# Patient Record
Sex: FEMALE | Race: WHITE | NOT HISPANIC OR LATINO
[De-identification: names, ages, dates, MRNs, and addresses within clinical notes are randomized per-mention and may not be internally consistent; named-entity substitution may affect disease eponyms.]

---

## 2017-01-19 ENCOUNTER — APPOINTMENT (OUTPATIENT)
Dept: INTERNAL MEDICINE | Facility: CLINIC | Age: 67
End: 2017-01-19

## 2017-01-19 VITALS
TEMPERATURE: 98 F | HEIGHT: 65 IN | WEIGHT: 130 LBS | SYSTOLIC BLOOD PRESSURE: 118 MMHG | HEART RATE: 80 BPM | DIASTOLIC BLOOD PRESSURE: 65 MMHG | OXYGEN SATURATION: 97 % | BODY MASS INDEX: 21.66 KG/M2

## 2017-02-13 LAB
ALBUMIN SERPL ELPH-MCNC: 4 G/DL
ALP BLD-CCNC: 61 U/L
ALT SERPL-CCNC: 17 U/L
ANION GAP SERPL CALC-SCNC: 15 MMOL/L
AST SERPL-CCNC: 19 U/L
BILIRUB SERPL-MCNC: 0.4 MG/DL
BUN SERPL-MCNC: 10 MG/DL
CALCIUM SERPL-MCNC: 9.8 MG/DL
CHLORIDE SERPL-SCNC: 103 MMOL/L
CHOLEST SERPL-MCNC: 170 MG/DL
CHOLEST/HDLC SERPL: 2.2 RATIO
CO2 SERPL-SCNC: 24 MMOL/L
CREAT SERPL-MCNC: 0.67 MG/DL
GLUCOSE SERPL-MCNC: 88 MG/DL
HBA1C MFR BLD HPLC: 5.7 %
HDLC SERPL-MCNC: 76 MG/DL
LDLC SERPL CALC-MCNC: 83 MG/DL
POTASSIUM SERPL-SCNC: 4.4 MMOL/L
PROT SERPL-MCNC: 6.6 G/DL
SODIUM SERPL-SCNC: 142 MMOL/L
TRIGL SERPL-MCNC: 54 MG/DL

## 2017-05-05 ENCOUNTER — OTHER (OUTPATIENT)
Age: 67
End: 2017-05-05

## 2017-05-05 DIAGNOSIS — S82.209A UNSPECIFIED FRACTURE OF SHAFT OF UNSPECIFIED TIBIA, INITIAL ENCOUNTER FOR CLOSED FRACTURE: ICD-10-CM

## 2017-05-08 ENCOUNTER — OTHER (OUTPATIENT)
Age: 67
End: 2017-05-08

## 2017-05-15 ENCOUNTER — CLINICAL ADVICE (OUTPATIENT)
Age: 67
End: 2017-05-15

## 2017-05-24 ENCOUNTER — MEDICATION RENEWAL (OUTPATIENT)
Age: 67
End: 2017-05-24

## 2017-06-15 ENCOUNTER — APPOINTMENT (OUTPATIENT)
Dept: INTERNAL MEDICINE | Facility: CLINIC | Age: 67
End: 2017-06-15

## 2017-06-15 VITALS
TEMPERATURE: 97.8 F | DIASTOLIC BLOOD PRESSURE: 80 MMHG | OXYGEN SATURATION: 98 % | SYSTOLIC BLOOD PRESSURE: 140 MMHG | HEART RATE: 77 BPM

## 2017-06-15 DIAGNOSIS — K63.5 POLYP OF COLON: ICD-10-CM

## 2017-06-15 DIAGNOSIS — R73.09 OTHER ABNORMAL GLUCOSE: ICD-10-CM

## 2017-07-03 LAB — HBA1C MFR BLD HPLC: 5.2 %

## 2017-09-02 ENCOUNTER — CLINICAL ADVICE (OUTPATIENT)
Age: 67
End: 2017-09-02

## 2017-10-19 ENCOUNTER — APPOINTMENT (OUTPATIENT)
Dept: INTERNAL MEDICINE | Facility: CLINIC | Age: 67
End: 2017-10-19

## 2018-01-17 ENCOUNTER — APPOINTMENT (OUTPATIENT)
Dept: GASTROENTEROLOGY | Facility: HOSPITAL | Age: 68
End: 2018-01-17

## 2018-01-17 ENCOUNTER — RESULT REVIEW (OUTPATIENT)
Age: 68
End: 2018-01-17

## 2018-01-17 ENCOUNTER — OUTPATIENT (OUTPATIENT)
Dept: OUTPATIENT SERVICES | Facility: HOSPITAL | Age: 68
LOS: 1 days | Discharge: HOME | End: 2018-01-17
Payer: MEDICARE

## 2018-01-17 LAB
HCT VFR BLD CALC: 39.7 % — SIGNIFICANT CHANGE UP (ref 34.5–45)
HGB BLD-MCNC: 13.8 G/DL — SIGNIFICANT CHANGE UP (ref 11.5–15.5)
MCHC RBC-ENTMCNC: 32.5 PG — SIGNIFICANT CHANGE UP (ref 27–34)
MCHC RBC-ENTMCNC: 34.8 G/DL — SIGNIFICANT CHANGE UP (ref 32–36)
MCV RBC AUTO: 93.4 FL — SIGNIFICANT CHANGE UP (ref 80–100)
PLATELET # BLD AUTO: 208 K/UL — SIGNIFICANT CHANGE UP (ref 150–400)
RBC # BLD: 4.25 M/UL — SIGNIFICANT CHANGE UP (ref 3.8–5.2)
RBC # FLD: 12.4 % — SIGNIFICANT CHANGE UP (ref 10.3–16.9)
WBC # BLD: 3.1 K/UL — LOW (ref 3.8–10.5)
WBC # FLD AUTO: 3.1 K/UL — LOW (ref 3.8–10.5)

## 2018-01-17 PROCEDURE — 93005 ELECTROCARDIOGRAM TRACING: CPT

## 2018-01-17 PROCEDURE — 45380 COLONOSCOPY AND BIOPSY: CPT | Mod: XS

## 2018-01-17 PROCEDURE — 45385 COLONOSCOPY W/LESION REMOVAL: CPT

## 2018-01-17 PROCEDURE — 93010 ELECTROCARDIOGRAM REPORT: CPT

## 2018-01-17 PROCEDURE — 88305 TISSUE EXAM BY PATHOLOGIST: CPT

## 2018-01-17 PROCEDURE — 85027 COMPLETE CBC AUTOMATED: CPT

## 2018-01-17 PROCEDURE — 36415 COLL VENOUS BLD VENIPUNCTURE: CPT

## 2018-01-18 LAB — SURGICAL PATHOLOGY STUDY: SIGNIFICANT CHANGE UP

## 2018-01-24 ENCOUNTER — RESULT REVIEW (OUTPATIENT)
Age: 68
End: 2018-01-24

## 2018-02-12 ENCOUNTER — CLINICAL ADVICE (OUTPATIENT)
Age: 68
End: 2018-02-12

## 2018-02-13 ENCOUNTER — TRANSCRIPTION ENCOUNTER (OUTPATIENT)
Age: 68
End: 2018-02-13

## 2018-03-08 ENCOUNTER — RX RENEWAL (OUTPATIENT)
Age: 68
End: 2018-03-08

## 2018-06-08 ENCOUNTER — RX RENEWAL (OUTPATIENT)
Age: 68
End: 2018-06-08

## 2018-07-23 ENCOUNTER — APPOINTMENT (OUTPATIENT)
Dept: INTERNAL MEDICINE | Facility: CLINIC | Age: 68
End: 2018-07-23
Payer: MEDICARE

## 2018-07-23 VITALS
SYSTOLIC BLOOD PRESSURE: 145 MMHG | WEIGHT: 130 LBS | HEIGHT: 65 IN | TEMPERATURE: 98.4 F | HEART RATE: 72 BPM | DIASTOLIC BLOOD PRESSURE: 67 MMHG | OXYGEN SATURATION: 98 % | RESPIRATION RATE: 15 BRPM | BODY MASS INDEX: 21.66 KG/M2

## 2018-07-23 DIAGNOSIS — M20.40 OTHER HAMMER TOE(S) (ACQUIRED), UNSPECIFIED FOOT: ICD-10-CM

## 2018-07-23 DIAGNOSIS — L98.9 DISORDER OF THE SKIN AND SUBCUTANEOUS TISSUE, UNSPECIFIED: ICD-10-CM

## 2018-07-23 DIAGNOSIS — R06.02 SHORTNESS OF BREATH: ICD-10-CM

## 2018-07-23 PROCEDURE — 99215 OFFICE O/P EST HI 40 MIN: CPT | Mod: 25

## 2018-07-23 PROCEDURE — 93000 ELECTROCARDIOGRAM COMPLETE: CPT

## 2018-07-23 PROCEDURE — 36415 COLL VENOUS BLD VENIPUNCTURE: CPT

## 2018-07-23 RX ORDER — WALKER
EACH MISCELLANEOUS
Qty: 1 | Refills: 0 | Status: DISCONTINUED | COMMUNITY
Start: 2017-05-05 | End: 2018-07-23

## 2018-07-26 LAB
25(OH)D3 SERPL-MCNC: 31.8 NG/ML
ALBUMIN SERPL ELPH-MCNC: 4.6 G/DL
ALP BLD-CCNC: 72 U/L
ALT SERPL-CCNC: 16 U/L
ANA PAT FLD IF-IMP: ABNORMAL
ANA SER IF-ACNC: ABNORMAL
ANION GAP SERPL CALC-SCNC: 16 MMOL/L
AST SERPL-CCNC: 20 U/L
BASOPHILS # BLD AUTO: 0.03 K/UL
BASOPHILS NFR BLD AUTO: 0.6 %
BILIRUB SERPL-MCNC: 0.6 MG/DL
BUN SERPL-MCNC: 10 MG/DL
CALCIUM SERPL-MCNC: 10 MG/DL
CALCIUM SERPL-MCNC: 10 MG/DL
CHLORIDE SERPL-SCNC: 98 MMOL/L
CHOLEST SERPL-MCNC: 197 MG/DL
CHOLEST/HDLC SERPL: 2.5 RATIO
CO2 SERPL-SCNC: 26 MMOL/L
CREAT SERPL-MCNC: 0.73 MG/DL
CREAT SPEC-SCNC: 21 MG/DL
CRP SERPL-MCNC: <0.1 MG/DL
EOSINOPHIL # BLD AUTO: 0.01 K/UL
EOSINOPHIL NFR BLD AUTO: 0.2 %
ERYTHROCYTE [SEDIMENTATION RATE] IN BLOOD BY WESTERGREN METHOD: 14 MM/HR
GLUCOSE SERPL-MCNC: 94 MG/DL
HBA1C MFR BLD HPLC: 5.8 %
HCT VFR BLD CALC: 43.9 %
HDLC SERPL-MCNC: 79 MG/DL
HGB BLD-MCNC: 14.6 G/DL
IMM GRANULOCYTES NFR BLD AUTO: 0.2 %
LDLC SERPL CALC-MCNC: 104 MG/DL
LYMPHOCYTES # BLD AUTO: 1.18 K/UL
LYMPHOCYTES NFR BLD AUTO: 24.9 %
MAN DIFF?: NORMAL
MCHC RBC-ENTMCNC: 32.2 PG
MCHC RBC-ENTMCNC: 33.3 GM/DL
MCV RBC AUTO: 96.9 FL
MICROALBUMIN 24H UR DL<=1MG/L-MCNC: <1.2 MG/DL
MICROALBUMIN/CREAT 24H UR-RTO: NORMAL
MONOCYTES # BLD AUTO: 0.45 K/UL
MONOCYTES NFR BLD AUTO: 9.5 %
NEUTROPHILS # BLD AUTO: 3.05 K/UL
NEUTROPHILS NFR BLD AUTO: 64.6 %
PARATHYROID HORMONE INTACT: 31 PG/ML
PLATELET # BLD AUTO: 215 K/UL
POTASSIUM SERPL-SCNC: 4.4 MMOL/L
PROT SERPL-MCNC: 7.7 G/DL
RBC # BLD: 4.53 M/UL
RBC # FLD: 13.3 %
SODIUM SERPL-SCNC: 141 MMOL/L
TRIGL SERPL-MCNC: 71 MG/DL
WBC # FLD AUTO: 4.73 K/UL

## 2018-09-10 ENCOUNTER — APPOINTMENT (OUTPATIENT)
Dept: INTERNAL MEDICINE | Facility: CLINIC | Age: 68
End: 2018-09-10
Payer: MEDICARE

## 2018-09-10 VITALS
HEIGHT: 65 IN | OXYGEN SATURATION: 98 % | HEART RATE: 79 BPM | TEMPERATURE: 98.1 F | WEIGHT: 127 LBS | BODY MASS INDEX: 21.16 KG/M2 | SYSTOLIC BLOOD PRESSURE: 133 MMHG | RESPIRATION RATE: 15 BRPM | DIASTOLIC BLOOD PRESSURE: 64 MMHG

## 2018-09-10 DIAGNOSIS — R73.03 PREDIABETES.: ICD-10-CM

## 2018-09-10 DIAGNOSIS — Z91.89 OTHER SPECIFIED PERSONAL RISK FACTORS, NOT ELSEWHERE CLASSIFIED: ICD-10-CM

## 2018-09-10 PROCEDURE — 99214 OFFICE O/P EST MOD 30 MIN: CPT

## 2018-09-12 ENCOUNTER — MEDICATION RENEWAL (OUTPATIENT)
Age: 68
End: 2018-09-12

## 2018-09-13 ENCOUNTER — RX RENEWAL (OUTPATIENT)
Age: 68
End: 2018-09-13

## 2018-09-17 ENCOUNTER — FORM ENCOUNTER (OUTPATIENT)
Age: 68
End: 2018-09-17

## 2018-09-18 ENCOUNTER — OUTPATIENT (OUTPATIENT)
Dept: OUTPATIENT SERVICES | Facility: HOSPITAL | Age: 68
LOS: 1 days | End: 2018-09-18
Payer: MEDICARE

## 2018-09-18 ENCOUNTER — APPOINTMENT (OUTPATIENT)
Dept: ULTRASOUND IMAGING | Facility: HOSPITAL | Age: 68
End: 2018-09-18
Payer: MEDICARE

## 2018-09-18 PROCEDURE — 76536 US EXAM OF HEAD AND NECK: CPT

## 2018-09-18 PROCEDURE — 76536 US EXAM OF HEAD AND NECK: CPT | Mod: 26

## 2018-11-14 ENCOUNTER — LABORATORY RESULT (OUTPATIENT)
Age: 68
End: 2018-11-14

## 2018-11-14 ENCOUNTER — APPOINTMENT (OUTPATIENT)
Dept: RHEUMATOLOGY | Facility: CLINIC | Age: 68
End: 2018-11-14
Payer: MEDICARE

## 2018-11-14 VITALS
SYSTOLIC BLOOD PRESSURE: 129 MMHG | WEIGHT: 127 LBS | HEIGHT: 65 IN | TEMPERATURE: 98 F | BODY MASS INDEX: 21.16 KG/M2 | DIASTOLIC BLOOD PRESSURE: 75 MMHG | RESPIRATION RATE: 15 BRPM | OXYGEN SATURATION: 98 % | HEART RATE: 86 BPM

## 2018-11-14 DIAGNOSIS — R76.8 OTHER SPECIFIED ABNORMAL IMMUNOLOGICAL FINDINGS IN SERUM: ICD-10-CM

## 2018-11-14 PROCEDURE — 99214 OFFICE O/P EST MOD 30 MIN: CPT | Mod: 25

## 2018-11-14 PROCEDURE — 36415 COLL VENOUS BLD VENIPUNCTURE: CPT

## 2018-11-15 LAB
ALBUMIN SERPL ELPH-MCNC: 4.4 G/DL
ALP BLD-CCNC: 60 U/L
ALT SERPL-CCNC: 18 U/L
ANION GAP SERPL CALC-SCNC: 12 MMOL/L
APTT BLD: 33.4 SEC
AST SERPL-CCNC: 23 U/L
B2 MICROGLOB SERPL-MCNC: 1.4 MG/L
BASOPHILS # BLD AUTO: 0.02 K/UL
BASOPHILS NFR BLD AUTO: 0.6 %
BILIRUB SERPL-MCNC: 0.4 MG/DL
BUN SERPL-MCNC: 9 MG/DL
C3 SERPL-MCNC: 108 MG/DL
C4 SERPL-MCNC: 24 MG/DL
CALCIUM SERPL-MCNC: 10.1 MG/DL
CCP AB SER IA-ACNC: <8 UNITS
CHLORIDE SERPL-SCNC: 101 MMOL/L
CK SERPL-CCNC: 66 U/L
CO2 SERPL-SCNC: 27 MMOL/L
CONFIRM: 23.2 SEC
CREAT SERPL-MCNC: 0.65 MG/DL
DRVVT IMM 1:2 NP PPP: NORMAL
DRVVT SCREEN TO CONFIRM RATIO: 0.94 RATIO
EOSINOPHIL # BLD AUTO: 0.01 K/UL
EOSINOPHIL NFR BLD AUTO: 0.3 %
ERYTHROCYTE [SEDIMENTATION RATE] IN BLOOD BY WESTERGREN METHOD: 6 MM/HR
GLUCOSE SERPL-MCNC: 87 MG/DL
HCT VFR BLD CALC: 42.6 %
HGB BLD-MCNC: 13.8 G/DL
IMM GRANULOCYTES NFR BLD AUTO: 0.3 %
LYMPHOCYTES # BLD AUTO: 1.11 K/UL
LYMPHOCYTES NFR BLD AUTO: 31.9 %
MAN DIFF?: NORMAL
MCHC RBC-ENTMCNC: 31.9 PG
MCHC RBC-ENTMCNC: 32.4 GM/DL
MCV RBC AUTO: 98.6 FL
MONOCYTES # BLD AUTO: 0.31 K/UL
MONOCYTES NFR BLD AUTO: 8.9 %
MPO AB + PR3 PNL SER: NORMAL
NEUTROPHILS # BLD AUTO: 2.02 K/UL
NEUTROPHILS NFR BLD AUTO: 58 %
PLATELET # BLD AUTO: 224 K/UL
POTASSIUM SERPL-SCNC: 4.5 MMOL/L
PROT SERPL-MCNC: 7.4 G/DL
RBC # BLD: 4.32 M/UL
RBC # FLD: 13.1 %
RF+CCP IGG SER-IMP: NEGATIVE
RHEUMATOID FACT SER QL: <10 IU/ML
SCREEN DRVVT: 26.8 SEC
SILICA CLOTTING TIME INTERPRETATION: NORMAL
SILICA CLOTTING TIME S/C: 0.97 RATIO
SODIUM SERPL-SCNC: 140 MMOL/L
URATE SERPL-MCNC: 3.1 MG/DL
WBC # FLD AUTO: 3.48 K/UL

## 2018-11-20 LAB
25(OH)D3 SERPL-MCNC: 44.7 NG/ML
ANA SER IF-ACNC: NEGATIVE
B2 GLYCOPROT1 IGA SERPL IA-ACNC: <5 SAU
B2 GLYCOPROT1 IGG SER-ACNC: <5 SGU
B2 GLYCOPROT1 IGM SER-ACNC: <5 SMU
CARDIOLIPIN AB SER IA-ACNC: NEGATIVE
CENTROMERE IGG SER-ACNC: <0.2 AL
CH50 SERPL-MCNC: 67 U/ML
CRYOFIB BLD-MCNC: NEGATIVE
DSDNA AB SER-ACNC: <12 IU/ML
ENA RNP AB SER IA-ACNC: 0.3 AL
ENA SCL70 IGG SER IA-ACNC: <0.2 AL
ENA SM AB SER IA-ACNC: <0.2 AL
ENA SS-A AB SER IA-ACNC: 1.1 AL
ENA SS-B AB SER IA-ACNC: <0.2 AL
HISTONE AB SER QL: 0.5 UNITS
T PALLIDUM AB SER QL IA: NEGATIVE

## 2018-11-26 ENCOUNTER — APPOINTMENT (OUTPATIENT)
Dept: GASTROENTEROLOGY | Facility: CLINIC | Age: 68
End: 2018-11-26
Payer: MEDICARE

## 2018-11-26 VITALS
BODY MASS INDEX: 21.33 KG/M2 | HEIGHT: 65 IN | RESPIRATION RATE: 14 BRPM | WEIGHT: 128 LBS | TEMPERATURE: 97.9 F | OXYGEN SATURATION: 97 % | DIASTOLIC BLOOD PRESSURE: 70 MMHG | HEART RATE: 71 BPM | SYSTOLIC BLOOD PRESSURE: 140 MMHG

## 2018-11-26 PROCEDURE — 99204 OFFICE O/P NEW MOD 45 MIN: CPT

## 2018-11-28 ENCOUNTER — MOBILE ON CALL (OUTPATIENT)
Age: 68
End: 2018-11-28

## 2018-11-28 DIAGNOSIS — R12 HEARTBURN: ICD-10-CM

## 2018-12-11 ENCOUNTER — FORM ENCOUNTER (OUTPATIENT)
Age: 68
End: 2018-12-11

## 2018-12-12 ENCOUNTER — APPOINTMENT (OUTPATIENT)
Dept: RADIOLOGY | Facility: HOSPITAL | Age: 68
End: 2018-12-12
Payer: MEDICARE

## 2018-12-12 ENCOUNTER — OUTPATIENT (OUTPATIENT)
Dept: OUTPATIENT SERVICES | Facility: HOSPITAL | Age: 68
LOS: 1 days | End: 2018-12-12
Payer: MEDICARE

## 2018-12-12 PROCEDURE — 77081 DXA BONE DENSITY APPENDICULR: CPT | Mod: 26

## 2018-12-12 PROCEDURE — 74220 X-RAY XM ESOPHAGUS 1CNTRST: CPT

## 2018-12-12 PROCEDURE — 77081 DXA BONE DENSITY APPENDICULR: CPT

## 2018-12-12 PROCEDURE — 74220 X-RAY XM ESOPHAGUS 1CNTRST: CPT | Mod: 26

## 2018-12-17 ENCOUNTER — MOBILE ON CALL (OUTPATIENT)
Age: 68
End: 2018-12-17

## 2018-12-18 ENCOUNTER — APPOINTMENT (OUTPATIENT)
Dept: INTERNAL MEDICINE | Facility: CLINIC | Age: 68
End: 2018-12-18
Payer: MEDICARE

## 2018-12-18 ENCOUNTER — OTHER (OUTPATIENT)
Age: 68
End: 2018-12-18

## 2018-12-18 VITALS
TEMPERATURE: 97.8 F | BODY MASS INDEX: 21.66 KG/M2 | WEIGHT: 130 LBS | SYSTOLIC BLOOD PRESSURE: 136 MMHG | DIASTOLIC BLOOD PRESSURE: 70 MMHG | HEIGHT: 65 IN | OXYGEN SATURATION: 97 % | HEART RATE: 75 BPM

## 2018-12-18 DIAGNOSIS — R76.8 OTHER SPECIFIED ABNORMAL IMMUNOLOGICAL FINDINGS IN SERUM: ICD-10-CM

## 2018-12-18 PROCEDURE — 90732 PPSV23 VACC 2 YRS+ SUBQ/IM: CPT

## 2018-12-18 PROCEDURE — G0009: CPT

## 2018-12-18 PROCEDURE — 99214 OFFICE O/P EST MOD 30 MIN: CPT | Mod: 25

## 2018-12-20 LAB — UREA BREATH TEST QL: NEGATIVE

## 2018-12-21 ENCOUNTER — MOBILE ON CALL (OUTPATIENT)
Age: 68
End: 2018-12-21

## 2018-12-28 ENCOUNTER — TRANSCRIPTION ENCOUNTER (OUTPATIENT)
Age: 68
End: 2018-12-28

## 2019-01-01 ENCOUNTER — TRANSCRIPTION ENCOUNTER (OUTPATIENT)
Age: 69
End: 2019-01-01

## 2019-02-27 ENCOUNTER — APPOINTMENT (OUTPATIENT)
Dept: OTOLARYNGOLOGY | Facility: CLINIC | Age: 69
End: 2019-02-27
Payer: MEDICARE

## 2019-02-27 VITALS
BODY MASS INDEX: 21.33 KG/M2 | HEIGHT: 65 IN | DIASTOLIC BLOOD PRESSURE: 63 MMHG | WEIGHT: 128 LBS | SYSTOLIC BLOOD PRESSURE: 139 MMHG | HEART RATE: 80 BPM

## 2019-02-27 DIAGNOSIS — J39.2 OTHER DISEASES OF PHARYNX: ICD-10-CM

## 2019-02-27 DIAGNOSIS — R19.6 HALITOSIS: ICD-10-CM

## 2019-02-27 DIAGNOSIS — J30.9 ALLERGIC RHINITIS, UNSPECIFIED: ICD-10-CM

## 2019-02-27 DIAGNOSIS — R59.1 GENERALIZED ENLARGED LYMPH NODES: ICD-10-CM

## 2019-02-27 DIAGNOSIS — L08.9 LOCAL INFECTION OF THE SKIN AND SUBCUTANEOUS TISSUE, UNSPECIFIED: ICD-10-CM

## 2019-02-27 DIAGNOSIS — C50.911 MALIGNANT NEOPLASM OF UNSPECIFIED SITE OF RIGHT FEMALE BREAST: ICD-10-CM

## 2019-02-27 DIAGNOSIS — Z82.49 FAMILY HISTORY OF ISCHEMIC HEART DISEASE AND OTHER DISEASES OF THE CIRCULATORY SYSTEM: ICD-10-CM

## 2019-02-27 DIAGNOSIS — Z92.29 PERSONAL HISTORY OF OTHER DRUG THERAPY: ICD-10-CM

## 2019-02-27 PROCEDURE — 31231 NASAL ENDOSCOPY DX: CPT

## 2019-02-27 PROCEDURE — 99204 OFFICE O/P NEW MOD 45 MIN: CPT | Mod: 25

## 2019-02-28 ENCOUNTER — OTHER (OUTPATIENT)
Age: 69
End: 2019-02-28

## 2019-03-03 ENCOUNTER — APPOINTMENT (OUTPATIENT)
Dept: SLEEP CENTER | Facility: HOSPITAL | Age: 69
End: 2019-03-03

## 2019-03-03 PROBLEM — J30.9 ALLERGIC RHINITIS: Status: ACTIVE | Noted: 2017-06-15

## 2019-03-03 PROBLEM — Z92.29 HISTORY OF PNEUMOCOCCAL VACCINATION: Status: RESOLVED | Noted: 2018-12-18 | Resolved: 2019-03-03

## 2019-03-03 PROBLEM — Z82.49 FAMILY HISTORY OF CORONARY ARTERY DISEASE: Status: ACTIVE | Noted: 2019-02-27

## 2019-03-03 RX ORDER — FLUTICASONE PROPIONATE 50 UG/1
50 SPRAY, METERED NASAL DAILY
Qty: 1 | Refills: 0 | Status: DISCONTINUED | COMMUNITY
Start: 2018-12-18 | End: 2019-02-27

## 2019-03-03 NOTE — PROCEDURE
[FreeTextEntry6] : \par Indication: Chronic sinusitis \par -Verbal consent was obtained from patient prior to exam. \par - Cristino-Synephrine spray applied to nose bilaterally.\par Nasal endoscopy was performed with flexible scope.\par Findings: \par -- Inferior turbinates mildly edematous bilateral.\par -- Septum was deviated to the left with spur against left inferior turbinate \par -- No polyps either side nose\par -- Middle and superior turbinates normal bilateral\par -- Middle meatus edema, left; clear on right.  Right SER cloudy draainage; left SER clear\par -- Nasopharynx without mass or exudate\par -- Adenoid pad small \par -- Eustachian orifices were clear bilateral\par \par The patient tolerated the procedure well. [de-identified] : \par Indication: dysphagia, LISA\par -Verbal consent was obtained from patient prior to procedure.\par -Cristino-Synephrine spray applied to the nasal cavities.\par Flexible laryngoscopy was performed via right nostril and revealed the following:\par   -- Nasopharynx had no mass or exudate.\par   -- Marked cobblestoning posterior pharyngeal, minimal erythema.\par   -- Base of tongue was symmetric and not enlarged.\par   -- Vallecula was clear\par   -- Epiglottis, both aryepiglottic folds and both false vocal folds were normal\par   -- Arytenoids both with mild edema and erythema \par   -- True vocal folds were fully mobile and without lesions. \par   -- Post cricoid area was clear.\par   -- Interarytenoid edema was moderate\par \par The patient tolerated the procedure well.\par \par

## 2019-03-03 NOTE — PHYSICAL EXAM
[Midline] : trachea located in midline position [FreeTextEntry1] : No hoarseness. [Nasal Endoscopy Performed] : nasal endoscopy was performed, see procedure section for findings [] : septum deviated to the left [Normal] : palatine tonsils are normal [Laryngoscopy Performed] : laryngoscopy was performed, see procedure section for findings [de-identified] : 0-1+ bilateral. [de-identified] : posterior pharyngeal wall with erythema  [de-identified] : Carotid pulses 2+ bilateral.

## 2019-03-03 NOTE — CONSULT LETTER
[Dear  ___] : Dear  [unfilled], [Consult Letter:] : I had the pleasure of evaluating your patient, [unfilled]. [Consult Closing:] : Thank you very much for allowing me to participate in the care of this patient.  If you have any questions, please do not hesitate to contact me. [Sincerely,] : Sincerely, [DrConstantino  ___] : Dr. WISE [FreeTextEntry2] : Keven Morales MD\par [facility] [facility], [address]\par [place] [id number] [FreeTextEntry1] : \par \par Enclosed please find my office notes for February 27, 2019. \par \par  [FreeTextEntry3] : \par Nel Ford MD \par Otolaryngology, Head and Neck Surgery \par Residency site , [facility]\par

## 2019-03-03 NOTE — HISTORY OF PRESENT ILLNESS
[de-identified] : I was pleased to evaluate this 68 year old woman who was referred by Dr. Morales for choking issues. \par She was accompanied by her sister who also provided history.\par \par Ms. Hogan presents with swallowing issues over the last 3-5 years that are becoming more frequent. \par Occasional choking during swallowing foods or saliva; feels like saliva pools. \par No problems swallowing other liquids. \par She said "food seems to go the wrong way", and she has to cough up the food, which seems to cause her to sneeze. \par Apples are very hard for her to swallow. \par States her throat is always very sticky and that she is aware of it. No FB sensation though. \par No throat pain or voice changes. \par Denies acid reflux or heartburn. \par \par For years she has had postnasal drip and nasal congestion, which predate choking episodes. \par Denies facial pressure. No hx of sinus infections. \par Has mild seasonal allergies (spring/summer).\par Used Flonase x 2 weeks, but does not feel it works. \par She snores very loud and breathes through her mouth. \par Has witnessed apneas. No daytime fatigue. \par \par Complains of having bad breath since 1990s. Uses TheraBreath. Has to scrape white tongue. \par No dental issues, brushes and flosses often. No mouth dryness. \par Has used many OTC mouthwashes and gargles. \par No chronic tonsillitis. Strep throat in her 30s, no hx of childhood infections. \par \par S/p radiation and chemo for breast cancer recurrence in 1991\par \par \par The medical records were reviewed and include the following:\par ESOPHAGRAM (12/12/2018) at [facility]:\par -   imaging demonstrates no consolidation or pleural effusion.  Cardiomediastinal silhouette is normal. Status post right mastectomy.  Mild dextroscoliosis of the lumbar spine.  \par - Radiographic and fluoroscopic examination of the esophagus was performed  using barium sulfate suspension and barium tablet. Swallowing and passage  of contrast through the pharynx appeared normal at fluoroscopy. There is  no evidence of esophageal dysmotility. No mass, stricture or other  esophageal abnormality is seen. There is no hiatal hernia. No  gastroesophageal reflux is observed.\par    IMPRESSION: Normal esophagram.  \par (Images were reviewed.) \par \par US  NECK (09/18/2018) at [facility]:\par  - RIGHT side:\par   --  Level 2  1.2 x 0.5 x 1.0 cm and  0.6 x 0.4 x 0.5 cm\par   --  Level 3  1.0 x 0.3 x 0.7 cm  \par - LEFT side:\par   --  Level 2  1.3 x 0.6 x 0.8 cm \par   -- Level 3  0.5 x 0.2 x 0.4 cm  \par - No abnormal lymph nodes are identified in the neck.  \par - The visualized thyroid gland is unremarkable\par     IMPRESSION: No neck mass or adenopathy identified.       \par \par \par PAST MEDICAL HISTORY\par Hypertension\par Hypercholesterolemia\par Osteoporosis/ arthritis \par Hx of breast cancer (right mastectomy 1988; chest wall resectio 1990, RT/chemo 1991)\par \par PAST SURGICAL HISTORY\par Right radical mastectomy 1988\par Chest wall resection 1990\par Left index finger microsurgeries \par Breast biopsy\par \par ALLERGIES\par Penicillin (rash; no reaction to Keflex)\par \par MEDICATIONS\par Atorvastatin 10 mg\par Amlodipine 5 mg\par Terbinafine 250 mg\par Vitamin D3 1,000IU\par Miralax PRN\par Move free\par Align probiotic\par \par SOCIAL HISTORY\par Never a smoker\par No alcohol use\par No drug use\par Volunteer work with students \par \par FAMILY HISTORY\par Diabetes (Sister)\par Cancer- stomach (M)\par CAD/MI (F)\par

## 2019-03-03 NOTE — ASSESSMENT
[FreeTextEntry1] : Ms. HEARD had the following issues addressed today:\par \par 1.) Dysphagia - choking with saliva and foods for several years.  No obstruction seen on esophagram\par 2.) Chronic sinusitis causing postnasal drip and laryngopharyngeal edema/inflammation \par 3.) Halitosis may also be from the sinusitis\par 4.) Snoring and witnessed apneas suggestive for Obstructive sleep apnea\par \par Plan:\par -- Referral for sleep study at [facility] \par -- Keflex 250 mg bid x 3 weeks \par -- Try mometasone nasal spray\par -- CT scan sinus w/o contrast after antibiotics\par -- Modified Barium swallow if choking does not resolve when postnasal drip stops \par \par Return in 6 weeks (after sleep study/ CT scan)\par

## 2019-03-03 NOTE — REVIEW OF SYSTEMS
[Patient Intake Form Reviewed] : Patient intake form was reviewed [As Noted in HPI] : as noted in HPI [Cough] : cough [Constipation] : constipation [Joint Pain] : joint pain [Joint Swelling] : joint swelling [Negative] : Heme/Lymph [Seasonal Allergies] : seasonal allergies [de-identified] : ?lyme disease [FreeTextEntry9] : back pain, muscle weakness, bone fractures

## 2019-03-07 ENCOUNTER — TRANSCRIPTION ENCOUNTER (OUTPATIENT)
Age: 69
End: 2019-03-07

## 2019-03-11 ENCOUNTER — TRANSCRIPTION ENCOUNTER (OUTPATIENT)
Age: 69
End: 2019-03-11

## 2019-03-24 ENCOUNTER — TRANSCRIPTION ENCOUNTER (OUTPATIENT)
Age: 69
End: 2019-03-24

## 2019-04-02 ENCOUNTER — FORM ENCOUNTER (OUTPATIENT)
Age: 69
End: 2019-04-02

## 2019-04-03 ENCOUNTER — RX RENEWAL (OUTPATIENT)
Age: 69
End: 2019-04-03

## 2019-04-03 ENCOUNTER — APPOINTMENT (OUTPATIENT)
Dept: CT IMAGING | Facility: HOSPITAL | Age: 69
End: 2019-04-03
Payer: MEDICARE

## 2019-04-03 ENCOUNTER — OUTPATIENT (OUTPATIENT)
Dept: OUTPATIENT SERVICES | Facility: HOSPITAL | Age: 69
LOS: 1 days | End: 2019-04-03
Payer: MEDICARE

## 2019-04-03 PROCEDURE — 70486 CT MAXILLOFACIAL W/O DYE: CPT

## 2019-04-03 PROCEDURE — 70486 CT MAXILLOFACIAL W/O DYE: CPT | Mod: 26

## 2019-04-06 ENCOUNTER — TRANSCRIPTION ENCOUNTER (OUTPATIENT)
Age: 69
End: 2019-04-06

## 2019-04-08 ENCOUNTER — TRANSCRIPTION ENCOUNTER (OUTPATIENT)
Age: 69
End: 2019-04-08

## 2019-04-17 ENCOUNTER — APPOINTMENT (OUTPATIENT)
Dept: OTOLARYNGOLOGY | Facility: CLINIC | Age: 69
End: 2019-04-17
Payer: MEDICARE

## 2019-04-17 VITALS
WEIGHT: 128 LBS | DIASTOLIC BLOOD PRESSURE: 65 MMHG | SYSTOLIC BLOOD PRESSURE: 138 MMHG | HEART RATE: 80 BPM | BODY MASS INDEX: 21.33 KG/M2 | HEIGHT: 65 IN

## 2019-04-17 DIAGNOSIS — S42.009A FRACTURE OF UNSPECIFIED PART OF UNSPECIFIED CLAVICLE, INITIAL ENCOUNTER FOR CLOSED FRACTURE: ICD-10-CM

## 2019-04-17 DIAGNOSIS — R09.82 POSTNASAL DRIP: ICD-10-CM

## 2019-04-17 DIAGNOSIS — J32.8 OTHER CHRONIC SINUSITIS: ICD-10-CM

## 2019-04-17 DIAGNOSIS — J34.2 DEVIATED NASAL SEPTUM: ICD-10-CM

## 2019-04-17 PROCEDURE — 99213 OFFICE O/P EST LOW 20 MIN: CPT

## 2019-04-17 RX ORDER — TERBINAFINE HYDROCHLORIDE 250 MG/1
250 TABLET ORAL DAILY
Refills: 0 | Status: COMPLETED | COMMUNITY
End: 2019-04-17

## 2019-04-17 RX ORDER — CEPHALEXIN 250 MG/1
250 TABLET ORAL TWICE DAILY
Qty: 42 | Refills: 0 | Status: COMPLETED | COMMUNITY
Start: 2019-02-27 | End: 2019-04-17

## 2019-04-17 RX ORDER — MULTIVIT-MIN/FOLIC/VIT K/LYCOP 400-300MCG
25 MCG TABLET ORAL DAILY
Qty: 90 | Refills: 1 | Status: COMPLETED | COMMUNITY
Start: 2018-09-10 | End: 2019-04-17

## 2019-04-17 RX ORDER — YEAST,DRIED (S. CEREVISIAE)
POWDER (GRAM) ORAL
Refills: 0 | Status: COMPLETED | COMMUNITY
End: 2019-04-17

## 2019-04-17 RX ORDER — LORATADINE 5 MG
TABLET,CHEWABLE ORAL
Refills: 0 | Status: COMPLETED | COMMUNITY
End: 2019-04-17

## 2019-04-17 RX ORDER — MOMETASONE 50 UG/1
50 SPRAY, METERED NASAL TWICE DAILY
Qty: 3 | Refills: 3 | Status: COMPLETED | COMMUNITY
Start: 2019-02-27 | End: 2019-04-17

## 2019-05-16 PROBLEM — J34.2 DEVIATED NASAL SEPTUM: Status: ACTIVE | Noted: 2019-05-16

## 2019-05-16 PROBLEM — J32.8 OTHER CHRONIC SINUSITIS: Status: ACTIVE | Noted: 2019-02-27

## 2019-05-16 PROBLEM — S42.009A CLAVICLE FRACTURE: Status: ACTIVE | Noted: 2019-05-16

## 2019-05-19 RX ORDER — MULTIVIT-MIN/FOLIC/VIT K/LYCOP 400-300MCG
25 MCG TABLET ORAL DAILY
Refills: 0 | Status: ACTIVE | COMMUNITY

## 2019-05-19 RX ORDER — BISMUTH SUBSALICYLATE 525 MG/1
TABLET ORAL
Qty: 30 | Refills: 0 | Status: DISCONTINUED | COMMUNITY
End: 2019-04-17

## 2019-05-19 NOTE — ASSESSMENT
[FreeTextEntry1] : Ms. HEARD had the following issues addressed today:\par \par 1.) Dysphagia - choking with saliva and foods for several years.  No obstruction seen on barium esophagram\par 2.) Chronic sinusitis causing postnasal drip and not improved with antibiotics x 3 weeks \par 3.) Halitosis may also be from the sinusitis\par 4.) Snoring and witnessed apneas suggestive for Obstructive sleep apnea\par \par Plan:\par -- modified barium swallow for more detailed study\par -- Make appt for sleep study at [facility] \par -- Continue Fluticasone nasal spray\par -- consider endoscopic sinus surgery since there is no change in sx after max medical therapy and there is chronic inflammation on CT.\par \par

## 2019-05-19 NOTE — HISTORY OF PRESENT ILLNESS
[FreeTextEntry1] : \par  [de-identified] : Ms. HOGAN  is a 68 year old F being seen today in the office for f/u.\par \par She completed 3 weeks of Keflex 250 mg bid in March 2019 and did not notice much of an improvement with her symptoms. She still is experiencing the same amount of postnasal drip that gets stuck in her throat. \par Has been using Fluticasone nasal spray daily. \par Sleep study at [facility] was on hold since she fell and broke her collarbone on 3/02/19. \par She plans to reschedule sleep study. \par \par Swallowing issues about the same.  Bolus sometimes goes down the wrong way.\par \par \par CT SINUS noncontrast (04/03/2019) at [facility]:\par - Prior Studies: None\par *  Prior Surgery: None is evident.\par *  Sinuses: The paranasal sinuses are well-developed. \par There is moderate opacification within the right mid-posterior ethmoid labyrinth. \par Scant mucosal thickening is seen posteriorly in the sphenoid sinuses. \par The maxillary and frontal sinuses are clear. \par No fluid level orbits of active odontogenic sinusitis. There is prior root canal of the right-sided maxillary first and second molar teeth, and additional periapical lucency of tooth #1 without root canal.\par *  Sinocranial and Sino-orbital Junctions: Intact and approximately symmetric configuration.\par *  Ostiomeatal Units: Patent, though narrowed in the presence of right eladio bullosa and low-lying ethmoid bullae.\par *  Frontal Sinus Outflow Tracts: Patent although crowded in the face of variant frontoethmoidal air cells.\par *  Sphenoethmoidal Recesses: Patent\par *  Nasal Cavity/Nasopharynx: There is sigmoid configuration of the nasal septum, bowing to the right anteriorly, but deviated significantly to the left in the mid to posterior nasal cavity with large spur distorting the inferior turbinate. \par No soft tissue mass is identified in the nasal cavity or bony destructive change. Noncontrast CT appearance of the nasopharynx is unremarkable. There is excessive dental streak artifact limiting evaluation of the oropharynx.\par *  Orbits: No pathologic soft tissue or mass effect.\par *  Brain:  Limited images through the brain show no hydrocephalus or mass effect.\par *  Mastoids: Well aerated.\par *  Other: Degenerative findings of the cervical spine are partially imaged with left asymmetric facet osteoarthritis, fused at C4-5. There is trace anterolisthesis at this level.\par IMPRESSION:\par Mild sites of inflammatory paranasal sinus disease. No outflow obstruction, but anatomic variation contributes to narrowing thereof, as above. There is nasal septal deviation, with notable spurring to the left.\par (Images were reviewed.) \par \par PRIOR Hx Feb 2019:\par 1.) Ms. Hogan presents with swallowing issues over the last 3-5 years that are becoming more frequent. \par Occasional choking during swallowing foods or saliva; feels like saliva pools. \par No problems swallowing other liquids. \par She said "food seems to go the wrong way", and she has to cough up the food, which seems to cause her to sneeze. \par Apples are very hard for her to swallow. \par States her throat is always very sticky and that she is aware of it. No FB sensation though. \par No throat pain or voice changes. \par Denies acid reflux or heartburn. \par For years she has had postnasal drip and nasal congestion, which predate choking episodes. \par Denies facial pressure. No hx of sinus infections. \par Has mild seasonal allergies (spring/summer).\par Used Flonase x 2 weeks, but does not feel it works. \par 2.) She snores very loud and breathes through her mouth. \par Has witnessed apneas. No daytime fatigue. \par 3.) Complains of having bad breath since 1990s. Uses TheraBreath. Has to scrape white tongue. \par No dental issues, brushes and flosses often. No mouth dryness. \par Has used many OTC mouthwashes and gargles. \par No chronic tonsillitis. Strep throat in her 30s, no hx of childhood infections. \par \par \par \par ESOPHAGRAM (12/12/2018) at [facility]:\par -  imaging demonstrates no consolidation or pleural effusion. Cardiomediastinal silhouette is normal. Status post right mastectomy. Mild dextroscoliosis of the lumbar spine.\par - Radiographic and fluoroscopic examination of the esophagus was performed using barium sulfate suspension and barium tablet. Swallowing and passage of contrast through the pharynx appeared normal at fluoroscopy. There is no evidence of esophageal dysmotility. No mass, stricture or other esophageal abnormality is seen. There is no hiatal hernia. No gastroesophageal reflux is observed.\par IMPRESSION: Normal esophagram.\par \par US NECK (09/18/2018) at [facility]:\par - RIGHT side:\par  -- Level 2 1.2 x 0.5 x 1.0 cm and 0.6 x 0.4 x 0.5 cm\par  -- Level 3 1.0 x 0.3 x 0.7 cm\par - LEFT side:\par  -- Level 2 1.3 x 0.6 x 0.8 cm\par  -- Level 3 0.5 x 0.2 x 0.4 cm\par - No abnormal lymph nodes are identified in the neck.\par - The visualized thyroid gland is unremarkable\par IMPRESSION: No neck mass or adenopathy identified.\par

## 2019-05-19 NOTE — PHYSICAL EXAM
[] : septum deviated to the left [Midline] : trachea located in midline position [Normal] : no neck adenopathy [de-identified] : 0-1+ bilateral.

## 2019-05-19 NOTE — CONSULT LETTER
[Dear  ___] : Dear  [unfilled], [Courtesy Letter:] : I had the pleasure of seeing your patient, [unfilled], in my office today. [Consult Closing:] : Thank you very much for allowing me to participate in the care of this patient.  If you have any questions, please do not hesitate to contact me. [Sincerely,] : Sincerely, [DrConstantino  ___] : Dr. WISE [FreeTextEntry2] : Keven Morales MD\par [facility] [facility], [address]\par [place] [id number] [FreeTextEntry1] : \par \par Enclosed please find my office notes for April 17, 2019. \par \par  [FreeTextEntry3] : \par Nel Ford MD \par Otolaryngology, Head and Neck Surgery \par Residency site , [facility]\par

## 2019-06-09 ENCOUNTER — HOSPITAL ENCOUNTER (EMERGENCY)
Facility: HOSPITAL | Age: 69
Discharge: HOME | End: 2019-06-09
Attending: EMERGENCY MEDICINE | Admitting: EMERGENCY MEDICINE
Payer: COMMERCIAL

## 2019-06-09 VITALS
TEMPERATURE: 98 F | OXYGEN SATURATION: 96 % | WEIGHT: 128 LBS | HEART RATE: 85 BPM | DIASTOLIC BLOOD PRESSURE: 76 MMHG | HEIGHT: 65 IN | SYSTOLIC BLOOD PRESSURE: 179 MMHG | RESPIRATION RATE: 18 BRPM | BODY MASS INDEX: 21.33 KG/M2

## 2019-06-09 DIAGNOSIS — M54.9 BACK PAIN: ICD-10-CM

## 2019-06-09 DIAGNOSIS — M62.838 MUSCLE SPASM: Primary | ICD-10-CM

## 2019-06-09 LAB
BILIRUB UR QL STRIP: NEGATIVE
CLARITY UR: CLEAR
COLOR UR: YELLOW
GLUCOSE UR STRIP-MCNC: NEGATIVE MG/DL
HGB UR QL STRIP.AUTO: NEGATIVE
KETONES UR STRIP-MCNC: NEGATIVE MG/DL
LEUKOCYTE ESTERASE UR QL STRIP: NEGATIVE
NITRITE UR QL STRIP: NEGATIVE
PH UR STRIP.AUTO: 6 [PH]
PROT UR STRIP-MCNC: NEGATIVE MG/DL
SP GR UR STRIP.AUTO: 1.02 (ref 1–1.03)
UROBILINOGEN UR QL STRIP.AUTO: 0.2 E.U./DL

## 2019-06-09 PROCEDURE — 96372 THER/PROPH/DIAG INJ SC/IM: CPT

## 2019-06-09 PROCEDURE — 99283 EMERGENCY DEPT VISIT LOW MDM: CPT | Performed by: EMERGENCY MEDICINE

## 2019-06-09 PROCEDURE — 99283 EMERGENCY DEPT VISIT LOW MDM: CPT

## 2019-06-09 PROCEDURE — 81003 URINALYSIS AUTO W/O SCOPE: CPT | Performed by: EMERGENCY MEDICINE

## 2019-06-09 RX ORDER — NAPROXEN 500 MG/1
500 TABLET ORAL 2 TIMES DAILY PRN
Qty: 14 TABLET | Refills: 0 | Status: SHIPPED | OUTPATIENT
Start: 2019-06-09 | End: 2019-06-16

## 2019-06-09 RX ORDER — METHOCARBAMOL 500 MG/1
500 TABLET, FILM COATED ORAL ONCE
Status: COMPLETED | OUTPATIENT
Start: 2019-06-09 | End: 2019-06-09

## 2019-06-09 RX ORDER — DIAZEPAM 5 MG/ML
5 INJECTION, SOLUTION INTRAMUSCULAR; INTRAVENOUS ONCE
Status: COMPLETED | OUTPATIENT
Start: 2019-06-09 | End: 2019-06-09

## 2019-06-09 RX ORDER — METHOCARBAMOL 500 MG/1
500 TABLET, FILM COATED ORAL 2 TIMES DAILY
Qty: 20 TABLET | Refills: 0 | Status: SHIPPED | OUTPATIENT
Start: 2019-06-09

## 2019-06-09 RX ORDER — LIDOCAINE 50 MG/G
1 PATCH TOPICAL ONCE
Status: DISCONTINUED | OUTPATIENT
Start: 2019-06-09 | End: 2019-06-09 | Stop reason: HOSPADM

## 2019-06-09 RX ORDER — KETOROLAC TROMETHAMINE 30 MG/ML
15 INJECTION, SOLUTION INTRAMUSCULAR; INTRAVENOUS ONCE
Status: COMPLETED | OUTPATIENT
Start: 2019-06-09 | End: 2019-06-09

## 2019-06-09 RX ORDER — DIAZEPAM 5 MG/1
5 TABLET ORAL EVERY 6 HOURS PRN
Qty: 12 TABLET | Refills: 0 | Status: SHIPPED | OUTPATIENT
Start: 2019-06-09 | End: 2019-06-15

## 2019-06-09 RX ADMIN — KETOROLAC TROMETHAMINE 15 MG: 30 INJECTION, SOLUTION INTRAMUSCULAR at 18:08

## 2019-06-09 RX ADMIN — DIAZEPAM 5 MG: 10 INJECTION, SOLUTION INTRAMUSCULAR; INTRAVENOUS at 18:55

## 2019-06-09 RX ADMIN — LIDOCAINE 1 PATCH: 50 PATCH CUTANEOUS at 18:08

## 2019-06-09 RX ADMIN — METHOCARBAMOL 500 MG: 500 TABLET ORAL at 18:08

## 2019-06-10 RX ORDER — LIDOCAINE 50 MG/G
1 PATCH TOPICAL DAILY
Qty: 15 PATCH | Refills: 0 | Status: SHIPPED | OUTPATIENT
Start: 2019-06-10 | End: 2019-06-25

## 2019-06-13 ENCOUNTER — TRANSCRIPTION ENCOUNTER (OUTPATIENT)
Age: 69
End: 2019-06-13

## 2019-06-15 ENCOUNTER — HOSPITAL ENCOUNTER (EMERGENCY)
Facility: HOSPITAL | Age: 69
Discharge: HOME | End: 2019-06-15
Attending: EMERGENCY MEDICINE
Payer: COMMERCIAL

## 2019-06-15 ENCOUNTER — APPOINTMENT (EMERGENCY)
Dept: CT IMAGING | Facility: HOSPITAL | Age: 69
End: 2019-06-15
Payer: COMMERCIAL

## 2019-06-15 VITALS
OXYGEN SATURATION: 95 % | RESPIRATION RATE: 16 BRPM | TEMPERATURE: 97.9 F | SYSTOLIC BLOOD PRESSURE: 134 MMHG | HEART RATE: 67 BPM | HEIGHT: 65 IN | BODY MASS INDEX: 21.34 KG/M2 | WEIGHT: 128.09 LBS | DIASTOLIC BLOOD PRESSURE: 64 MMHG

## 2019-06-15 DIAGNOSIS — S22.080A T12 COMPRESSION FRACTURE (HCC): ICD-10-CM

## 2019-06-15 DIAGNOSIS — M54.50 ACUTE LEFT-SIDED LOW BACK PAIN WITHOUT SCIATICA: Primary | ICD-10-CM

## 2019-06-15 DIAGNOSIS — N28.1 RENAL CYST: ICD-10-CM

## 2019-06-15 LAB
ALBUMIN SERPL BCP-MCNC: 3.5 G/DL (ref 3.5–5)
ALP SERPL-CCNC: 74 U/L (ref 46–116)
ALT SERPL W P-5'-P-CCNC: 17 U/L (ref 12–78)
ANION GAP SERPL CALCULATED.3IONS-SCNC: 6 MMOL/L (ref 4–13)
AST SERPL W P-5'-P-CCNC: 22 U/L (ref 5–45)
BACTERIA UR QL AUTO: ABNORMAL /HPF
BASOPHILS # BLD AUTO: 0.04 THOUSANDS/ΜL (ref 0–0.1)
BASOPHILS NFR BLD AUTO: 1 % (ref 0–1)
BILIRUB SERPL-MCNC: 0.5 MG/DL (ref 0.2–1)
BILIRUB UR QL STRIP: NEGATIVE
BUN SERPL-MCNC: 13 MG/DL (ref 5–25)
CALCIUM SERPL-MCNC: 9.2 MG/DL (ref 8.3–10.1)
CHLORIDE SERPL-SCNC: 102 MMOL/L (ref 100–108)
CLARITY UR: CLEAR
CO2 SERPL-SCNC: 29 MMOL/L (ref 21–32)
COLOR UR: YELLOW
CREAT SERPL-MCNC: 0.62 MG/DL (ref 0.6–1.3)
EOSINOPHIL # BLD AUTO: 0.02 THOUSAND/ΜL (ref 0–0.61)
EOSINOPHIL NFR BLD AUTO: 0 % (ref 0–6)
ERYTHROCYTE [DISTWIDTH] IN BLOOD BY AUTOMATED COUNT: 11.9 % (ref 11.6–15.1)
GFR SERPL CREATININE-BSD FRML MDRD: 91 ML/MIN/1.73SQ M
GLUCOSE SERPL-MCNC: 117 MG/DL (ref 65–140)
GLUCOSE UR STRIP-MCNC: NEGATIVE MG/DL
HCT VFR BLD AUTO: 39.1 % (ref 34.8–46.1)
HGB BLD-MCNC: 13.6 G/DL (ref 11.5–15.4)
HGB UR QL STRIP.AUTO: NEGATIVE
IMM GRANULOCYTES # BLD AUTO: 0.04 THOUSAND/UL (ref 0–0.2)
IMM GRANULOCYTES NFR BLD AUTO: 1 % (ref 0–2)
KETONES UR STRIP-MCNC: ABNORMAL MG/DL
LEUKOCYTE ESTERASE UR QL STRIP: NEGATIVE
LYMPHOCYTES # BLD AUTO: 0.76 THOUSANDS/ΜL (ref 0.6–4.47)
LYMPHOCYTES NFR BLD AUTO: 11 % (ref 14–44)
MCH RBC QN AUTO: 32.4 PG (ref 26.8–34.3)
MCHC RBC AUTO-ENTMCNC: 34.8 G/DL (ref 31.4–37.4)
MCV RBC AUTO: 93 FL (ref 82–98)
MONOCYTES # BLD AUTO: 0.39 THOUSAND/ΜL (ref 0.17–1.22)
MONOCYTES NFR BLD AUTO: 6 % (ref 4–12)
MUCOUS THREADS UR QL AUTO: ABNORMAL
NEUTROPHILS # BLD AUTO: 5.57 THOUSANDS/ΜL (ref 1.85–7.62)
NEUTS SEG NFR BLD AUTO: 81 % (ref 43–75)
NITRITE UR QL STRIP: NEGATIVE
NON-SQ EPI CELLS URNS QL MICRO: ABNORMAL /HPF
NRBC BLD AUTO-RTO: 0 /100 WBCS
PH UR STRIP.AUTO: 6.5 [PH]
PLATELET # BLD AUTO: 186 THOUSANDS/UL (ref 149–390)
PMV BLD AUTO: 10.3 FL (ref 8.9–12.7)
POTASSIUM SERPL-SCNC: 4 MMOL/L (ref 3.5–5.3)
PROT SERPL-MCNC: 7.2 G/DL (ref 6.4–8.2)
PROT UR STRIP-MCNC: NEGATIVE MG/DL
RBC # BLD AUTO: 4.2 MILLION/UL (ref 3.81–5.12)
RBC #/AREA URNS AUTO: ABNORMAL /HPF
SODIUM SERPL-SCNC: 137 MMOL/L (ref 136–145)
SP GR UR STRIP.AUTO: 1.02 (ref 1–1.03)
UROBILINOGEN UR QL STRIP.AUTO: 0.2 E.U./DL
WBC # BLD AUTO: 6.82 THOUSAND/UL (ref 4.31–10.16)
WBC #/AREA URNS AUTO: ABNORMAL /HPF

## 2019-06-15 PROCEDURE — 99284 EMERGENCY DEPT VISIT MOD MDM: CPT

## 2019-06-15 PROCEDURE — 81001 URINALYSIS AUTO W/SCOPE: CPT | Performed by: EMERGENCY MEDICINE

## 2019-06-15 PROCEDURE — 96374 THER/PROPH/DIAG INJ IV PUSH: CPT

## 2019-06-15 PROCEDURE — 74176 CT ABD & PELVIS W/O CONTRAST: CPT

## 2019-06-15 PROCEDURE — 85025 COMPLETE CBC W/AUTO DIFF WBC: CPT | Performed by: EMERGENCY MEDICINE

## 2019-06-15 PROCEDURE — 96375 TX/PRO/DX INJ NEW DRUG ADDON: CPT

## 2019-06-15 PROCEDURE — 80053 COMPREHEN METABOLIC PANEL: CPT | Performed by: EMERGENCY MEDICINE

## 2019-06-15 PROCEDURE — 99284 EMERGENCY DEPT VISIT MOD MDM: CPT | Performed by: EMERGENCY MEDICINE

## 2019-06-15 PROCEDURE — 36415 COLL VENOUS BLD VENIPUNCTURE: CPT | Performed by: EMERGENCY MEDICINE

## 2019-06-15 RX ORDER — ONDANSETRON 2 MG/ML
4 INJECTION INTRAMUSCULAR; INTRAVENOUS ONCE
Status: COMPLETED | OUTPATIENT
Start: 2019-06-15 | End: 2019-06-15

## 2019-06-15 RX ORDER — HYDROMORPHONE HCL/PF 1 MG/ML
0.5 SYRINGE (ML) INJECTION ONCE
Status: COMPLETED | OUTPATIENT
Start: 2019-06-15 | End: 2019-06-15

## 2019-06-15 RX ADMIN — HYDROMORPHONE HYDROCHLORIDE 0.5 MG: 1 INJECTION, SOLUTION INTRAMUSCULAR; INTRAVENOUS; SUBCUTANEOUS at 14:22

## 2019-06-15 RX ADMIN — ONDANSETRON 4 MG: 2 INJECTION INTRAMUSCULAR; INTRAVENOUS at 15:40

## 2019-06-17 ENCOUNTER — OTHER (OUTPATIENT)
Age: 69
End: 2019-06-17

## 2019-06-18 ENCOUNTER — FORM ENCOUNTER (OUTPATIENT)
Age: 69
End: 2019-06-18

## 2019-06-19 ENCOUNTER — APPOINTMENT (OUTPATIENT)
Dept: SLEEP CENTER | Facility: HOSPITAL | Age: 69
End: 2019-06-19

## 2019-06-19 ENCOUNTER — APPOINTMENT (OUTPATIENT)
Dept: RADIOLOGY | Facility: CLINIC | Age: 69
End: 2019-06-19
Payer: MEDICARE

## 2019-06-19 PROCEDURE — 77080 DXA BONE DENSITY AXIAL: CPT | Mod: 26

## 2019-06-20 ENCOUNTER — APPOINTMENT (OUTPATIENT)
Dept: INTERNAL MEDICINE | Facility: CLINIC | Age: 69
End: 2019-06-20
Payer: MEDICARE

## 2019-06-20 VITALS — SYSTOLIC BLOOD PRESSURE: 170 MMHG | DIASTOLIC BLOOD PRESSURE: 80 MMHG

## 2019-06-20 VITALS
HEART RATE: 70 BPM | HEIGHT: 65 IN | SYSTOLIC BLOOD PRESSURE: 167 MMHG | DIASTOLIC BLOOD PRESSURE: 78 MMHG | WEIGHT: 129 LBS | TEMPERATURE: 99.2 F | BODY MASS INDEX: 21.49 KG/M2 | OXYGEN SATURATION: 98 %

## 2019-06-20 DIAGNOSIS — K59.09 OTHER CONSTIPATION: ICD-10-CM

## 2019-06-20 PROCEDURE — 99215 OFFICE O/P EST HI 40 MIN: CPT

## 2019-06-25 ENCOUNTER — APPOINTMENT (OUTPATIENT)
Dept: INTERNAL MEDICINE | Facility: CLINIC | Age: 69
End: 2019-06-25

## 2019-07-10 ENCOUNTER — APPOINTMENT (OUTPATIENT)
Dept: PHYSICAL MEDICINE AND REHAB | Facility: CLINIC | Age: 69
End: 2019-07-10
Payer: MEDICARE

## 2019-07-10 VITALS
HEART RATE: 92 BPM | DIASTOLIC BLOOD PRESSURE: 80 MMHG | HEIGHT: 65 IN | OXYGEN SATURATION: 98 % | WEIGHT: 129 LBS | BODY MASS INDEX: 21.49 KG/M2 | SYSTOLIC BLOOD PRESSURE: 120 MMHG

## 2019-07-10 DIAGNOSIS — M54.6 PAIN IN THORACIC SPINE: ICD-10-CM

## 2019-07-10 PROCEDURE — 99204 OFFICE O/P NEW MOD 45 MIN: CPT

## 2019-07-10 RX ORDER — VITAMIN E (DL,TOCOPHERYL ACET) 180 MG
500-3 CAPSULE ORAL DAILY
Qty: 60 | Refills: 2 | Status: ACTIVE | COMMUNITY
Start: 2019-07-10 | End: 1900-01-01

## 2019-07-10 NOTE — REVIEW OF SYSTEMS
[Patient Intake Form Reviewed] : Patient intake form was reviewed [FreeTextEntry1] : Constitutional: no chills, not feeling tired and no fever. \par Eyes: no discharge, no pain and no redness. \par ENT: no nasal discharge, no nosebleeds and no sore throat. \par Cardiovascular: no chest pain, no palpitations and the heart rate was not fast. \par Respiratory: no shortness of breath, no cough and no wheezing. \par Gastrointestinal: no abdominal pain, no diarrhea, no vomiting and no melena. \par Genitourinary: no pelvic pain, no dysuria, no abnormal urethral discharge and no frequency. \par Integumentary: no skin lesions and no change in a mole. \par Neurological: no dizziness, no fainting and no convulsions. \par Endocrine: no deepening of the voice and no hot flashes. \par Hematologic/Lymphatic: does not bleed easily, no swollen glands and no cervical lymphadenopathy. \par Musculoskeletal: as per HPI, otherwise denies additional joint pain, effusions, stiffness.\par All other review of systems are negative except as noted. \par

## 2019-07-10 NOTE — DATA REVIEWED
[FreeTextEntry1] : XR LS 11/2016: Loss of height of the T12 vertebral body, likely a compression deformity age-indeterminate. There is loss of disc height at L5/S1.

## 2019-07-10 NOTE — HISTORY OF PRESENT ILLNESS
[FreeTextEntry1] : Ms. TAPAN HEARD is a very pleasant 69 year female who comes in for evaluation of back muscles spasms  that has been ongoing for more than 20 years without any specific injury or inciting event. The pain is located primarily on the upper and mid back muscles intermittent in nature without any radiating symptoms to the extremities and described as intense pain. The pain is rated as 2/10 during today's visit, and ranges from 1-10/10. The patient's symptoms are aggravated by standing and hunching for lengthy periods of time and alleviated by sitting down with lumbar support and  ice/hot compresses. The patient denies any night pain, numbness/tingling, weakness, or bowel/bladder dysfunction. The patient has no other complaints at this time.

## 2019-07-10 NOTE — PHYSICAL EXAM
[FreeTextEntry1] : GEN: AAOx3, NAD.\par PSYCH: Normal mood and affect. Responds appropriately to commands.\par EYES: Sclerae Anicteric. No discharge. EOMI.\par RESP: Breathing unlabored.\par CV: Radial pulses 2+ and equal. No varicosities noted.\par EXT: No C/C/E.\par SKIN: No lesions noted.\par LYMPH: No supraclavicular, anterior or posterior cervical lymphadenopathy appreciated.\par GAIT: Non antalgic, Normal reciprocating heel to toe.\par STRENGTH: 5/5 bilateral shoulder abductors, elbow flexors, elbow extensors, wrist extensors, hand intrinsics, long finger flexors to D3 and D5.\par TONE: Normal, No clonus.\par REFLEXES: 2+ symmetric biceps, triceps, brachioradialis, pronator teres. Alejandra's negative bilaterally.\par SENSATION: Grossly intact to light touch bilateral upper extremities.\par INSP: Spine alignment is midline, with no evidence of scoliosis.\par CERVICAL ROM: Flexion, extension, side-bending, rotation, oblique extension all full and pain free.  \par SHOULDER ROM: Full and pain free bilaterally.\par PALP: (+) TTP B-TSparaspinals/Rhomboids/Periscapulars. There is no tenderness over the midline spinous processes, paravertebral muscles, trapezius, levator scapulae or shoulder region bilaterally.\par SPECIAL: Spurling's maneuver negative bilaterally. Axial Compression negative. Lhermitte's sign negative.

## 2019-07-10 NOTE — ASSESSMENT
[FreeTextEntry1] : Impression:\par 1. Thoracalgia\par \par Plan: After review of the history and physical examination the patient's symptoms are consistent with myofascial pain thoracalgia. The diagnosis was discussed in detail with the patient and the impact of posture and stress on her condition. We discussed all the potential treatment options including physical therapy, oral medication, interventional spine procedures, and surgery; as well as alternative therapeutics such as acupuncture and massage. We also discussed the importance of weight loss, ergonomics, and posture in the long term management of the condition. At this time I am recommending that the patient undergo a course of physical therapy. We emphasized the importance of the home exercise program. I have also recommended the patient trial acupuncture as an additional treatment modality. I also recommended the patient trial a daily Curcumin/Turmeric supplement for anti-inflammatory benefit. The patient will return to the office for followup in 2-3 months. The patient expressed verbal understanding and is in agreement with the plan of care. All of the patient's questions and concerns were addressed during today's visit.\par

## 2019-09-05 ENCOUNTER — FORM ENCOUNTER (OUTPATIENT)
Age: 69
End: 2019-09-05

## 2019-09-05 ENCOUNTER — APPOINTMENT (OUTPATIENT)
Dept: ORTHOPEDIC SURGERY | Facility: CLINIC | Age: 69
End: 2019-09-05
Payer: MEDICARE

## 2019-09-05 ENCOUNTER — OUTPATIENT (OUTPATIENT)
Dept: OUTPATIENT SERVICES | Facility: HOSPITAL | Age: 69
LOS: 1 days | End: 2019-09-05
Payer: MEDICARE

## 2019-09-05 VITALS
WEIGHT: 130 LBS | OXYGEN SATURATION: 97 % | HEART RATE: 67 BPM | SYSTOLIC BLOOD PRESSURE: 126 MMHG | HEIGHT: 64 IN | DIASTOLIC BLOOD PRESSURE: 80 MMHG | BODY MASS INDEX: 22.2 KG/M2

## 2019-09-05 PROCEDURE — 73110 X-RAY EXAM OF WRIST: CPT | Mod: 26,RT

## 2019-09-05 PROCEDURE — 73110 X-RAY EXAM OF WRIST: CPT

## 2019-09-05 PROCEDURE — 99205 OFFICE O/P NEW HI 60 MIN: CPT

## 2019-09-05 NOTE — ASSESSMENT
[FreeTextEntry1] : \par 69 year old woman with osteoporosis by DXA at lumbar spine (-2.5) and FN )-3.1) with osteopenia of TH and 1/3 radius. There is history of 2 inches of height loss, slight kyphosis, and several traumatic fractures. The patient has  a remote history of breast cancer and prior treatment with bisphosphonates which she states "did not help"; this likely means that there was no significant increase in BMD although she admits her BMD was stable in the osteopenic range for many years.\par \par Plan:\par 1. the patient may benefit from a re-treatment with bisphosphonate, now that it has been at least 9 years since her last IV Reclast dose; we will check updated bone markers\par 2. check: PTH/ca/alb 25 D, SPEP\par 3. x-ray of wrist (right) ordered today; patient to call tomorrow for results\par 4. f/u after labs to plan Rx, perhaps a dose of IV Reclast based on findings

## 2019-09-05 NOTE — HISTORY OF PRESENT ILLNESS
[FreeTextEntry1] : 69 year old woman with history of breast cancer at age 40 and early chemotherapy induce menopause.\par The patient has been referred because she is concerned about bone loss, and wants to maintain her health, strength, height and avoid kyphosis.\par She has been treated remotely with alendronate (in the 1990s) ibandronate, and at least 3, possibly 4 IV Reclast infusions. She thinks her last IV Reclast Rx would have been at least 9 years ago.\par She has also been previously treated with raloxifene.\par Maximal height was 5'7" she is currently 5'5".\par There is hx of several traumatic fractures, ulna, tibial plateau, clavicle, distal radius, metatarsal.\par Of note, the patient fell about a week ago and injured her right wrist; she has been wearing an Ace bandage and is concerned there may be a hairline fracture.

## 2019-09-05 NOTE — REVIEW OF SYSTEMS
[Constipation] : constipation [Polyuria] : polyuria [Joint Pain] : joint pain [Back Pain] : back pain [Joint Stiffness] : joint stiffness [Depression] : depression [Insomnia] : insomnia [As Noted in HPI] : as noted in HPI [All other systems negative] : All other systems negative

## 2019-09-05 NOTE — PHYSICAL EXAM
[Alert] : alert [No Acute Distress] : no acute distress [Well Nourished] : well nourished [Normal Sclera/Conjunctiva] : normal sclera/conjunctiva [No Proptosis] : no proptosis [No Neck Mass] : no neck mass was observed [Thyroid Not Enlarged] : the thyroid was not enlarged [Anterior Cervical Nodes] : anterior cervical nodes [Post Cervical Nodes] : posterior cervical nodes [Normal] : normal and non tender [Kyphosis] : kyphosis present [Scoliosis] : scoliosis present [No Stigmata of Cushings Syndrome] : no stigmata of cushings syndrome [Normal Gait] : normal gait [No Tremors] : no tremors [de-identified] : swelling of DIPs and PIPs and deformed index finger(left hand) prior injury

## 2019-09-06 ENCOUNTER — APPOINTMENT (OUTPATIENT)
Dept: ORTHOPEDIC SURGERY | Facility: CLINIC | Age: 69
End: 2019-09-06
Payer: MEDICARE

## 2019-09-06 PROCEDURE — 99214 OFFICE O/P EST MOD 30 MIN: CPT | Mod: 25

## 2019-09-06 PROCEDURE — 29075 APPL CST ELBW FNGR SHORT ARM: CPT | Mod: RT

## 2019-09-06 NOTE — PHYSICAL EXAM
[UE] : Sensory: Intact in bilateral upper extremities [Rad] : radial 2+ and symmetric bilaterally [Normal RUE] : Right Upper Extremity: No scars, rashes, lesions, ulcers, skin intact [Normal Touch] : sensation intact for touch [Normal LUE] : Left Upper Extremity: No scars, rashes, lesions, ulcers, skin intact [Normal] : Oriented to person, place, and time, insight and judgement were intact and the affect was normal [de-identified] : No respiratory distress [de-identified] : \par RIGHT wrist\par no significant edema no ecchymoses or erythema.\par There is some deformity and prominence at the thumb CMC joint consistent with osteoarthritis with tenderness.\par There is tenderness on the pisiform in the ulnar palm. Nontender LEFT wrist in this location.\par Wrist range of motion is with about 70 ° dorsiflexion and palmar flexion with mild discomfort in the wrist.\par Intact flexion and extension of the fingers.\par Normal neurovascular exam [de-identified] : \par X-rays of the wrist AP, lateral and oblique views taken yesterday at [facility] show a nondisplaced fracture of the pisiform. Thumb CMC arthrosis

## 2019-09-06 NOTE — HISTORY OF PRESENT ILLNESS
[de-identified] : Ms. Hogan is a 69-year-old Right-hand dominant woman who comes in for evaluation of her wrist. She had seen her endocrinologist, Dr. Berry, who treats her for osteoporosis who obtained x-rays yesterday of the wrist and it showed a pisiform fracture. She was referred for further evaluation. The 1990s she had been ongoing need and Fosamax. She may start on week last now for her osteoporosis.\par She injured her wrist when she was cleaning the tub and she slipped forward and came down on her wrist guard. This was about a week ago and it has been hurting. There wasn't a lot of swelling or bruising.\par She had a hamate fracture in 2017 which healed and was feeling fine and she had a distal radius fracture in the 1990s. Pain is on the ulnar side of her hand but she also has some chronic pain around the thumb where she has arthritis.\par Pain is about 3/10 and it is dull and achy. It is better when the wrist is stabilized. It's worse with activity. She hasn't had any treatment. She applied and Ace wrap.

## 2019-09-06 NOTE — DISCUSSION/SUMMARY
[de-identified] : 69-year-old woman with osteoporosis came down hard on her wrist about a week ago and it looks like she has a nondisplaced pisiform fracture. 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 She should keep it clean and dry. Elevate for any swelling. Tylenol if needed for pain. She is taking vitamin D. If there any problems she should let me know and otherwise followup in 2-1/2-3 weeks at which time we'll change her cast. I may place her into a splint at that time but we will decide when I see her and we will get new x-rays

## 2019-10-01 PROBLEM — S62.164A: Status: ACTIVE | Noted: 2017-05-08

## 2019-10-01 PROBLEM — S60.211A CONTUSION OF RIGHT WRIST, INITIAL ENCOUNTER: Status: ACTIVE | Noted: 2019-09-06

## 2019-10-02 ENCOUNTER — APPOINTMENT (OUTPATIENT)
Dept: ORTHOPEDIC SURGERY | Facility: CLINIC | Age: 69
End: 2019-10-02
Payer: MEDICARE

## 2019-10-02 DIAGNOSIS — S62.164A: ICD-10-CM

## 2019-10-02 DIAGNOSIS — M19.049 PRIMARY OSTEOARTHRITIS, UNSPECIFIED HAND: ICD-10-CM

## 2019-10-02 DIAGNOSIS — S60.211A CONTUSION OF RIGHT WRIST, INITIAL ENCOUNTER: ICD-10-CM

## 2019-10-02 PROCEDURE — 99214 OFFICE O/P EST MOD 30 MIN: CPT

## 2019-10-02 PROCEDURE — 73110 X-RAY EXAM OF WRIST: CPT | Mod: RT

## 2019-10-02 NOTE — HISTORY OF PRESENT ILLNESS
[de-identified] : Ms. Hogan comes in for followup for her RIGHT wrist pisiform fracture that occurred about 4 weeks ago. She has been in a short-arm cast. She has minimal wrist pain. She tolerated the cast fine. She's not taking anything For pain

## 2019-10-02 NOTE — DISCUSSION/SUMMARY
[de-identified] : 69-year-old with RIGHT pisiform fracture 4 weeks ago. Clinically the wrist is improving but there is still tenderness and pain and visible fracture on x-ray. She will wear this splint most of the time as if it were a cast but can remove it occasionally just to wash her arm. Minimize any use her motion.\par She has arthritis of the thumb CMC joint which is somewhat symptomatic as well. If pain persists or gets worse in the future corticosteroid injection can be done. It will probably take another 3-4 weeks for the fracture to heal. She should followup in about 3 weeks.

## 2019-10-02 NOTE — PHYSICAL EXAM
[UE] : Sensory: Intact in bilateral upper extremities [Rad] : radial 2+ and symmetric bilaterally [Normal RUE] : Right Upper Extremity: No scars, rashes, lesions, ulcers, skin intact [Normal LUE] : Left Upper Extremity: No scars, rashes, lesions, ulcers, skin intact [Normal Touch] : sensation intact for touch [de-identified] : RIGHT  wrist exam\par cast was removed\par Skin: Clean, dry, intact. No ecchymosis. No swelling. No palpable joint effusion. No palpable deformity. No crepitus\par ROM: RIGHT wrist is with stiffness with about 60° dorsiflexion and palmar flexion with some ulnar wrist pain. Intact pronation and supination.\par Tender on the pisiform but less than last visit. Tender thumb CMC joint\par Tenderness: No tenderness to palpation at the distal radius, distal ulna, the DRUJ. No pain at the scapholunate interval. No snuffbox pain.\par Strength: Mild weakness  strength\par Stability: Stable DRUJ \par Vasc: 2+ radial pulse, <2s cap refill\par Sensation: Intact to light touch throughout\par Neuro: Negative tinels over median nerve\par AIN/PIN/Ulnar nerve intact to motor/sensation.\par  [de-identified] : \par X-rays RIGHT wrist 3 views with the tunnel view today show Moderate thumb CMC joint arthrosis and nondisplaced fracture of the pisiform.

## 2019-10-17 ENCOUNTER — RX RENEWAL (OUTPATIENT)
Age: 69
End: 2019-10-17

## 2019-10-30 ENCOUNTER — APPOINTMENT (OUTPATIENT)
Dept: ORTHOPEDIC SURGERY | Facility: CLINIC | Age: 69
End: 2019-10-30

## 2019-11-07 ENCOUNTER — APPOINTMENT (OUTPATIENT)
Dept: ORTHOPEDIC SURGERY | Facility: CLINIC | Age: 69
End: 2019-11-07
Payer: MEDICARE

## 2019-11-07 VITALS
SYSTOLIC BLOOD PRESSURE: 128 MMHG | HEIGHT: 65 IN | OXYGEN SATURATION: 98 % | DIASTOLIC BLOOD PRESSURE: 64 MMHG | BODY MASS INDEX: 21.66 KG/M2 | WEIGHT: 130 LBS | HEART RATE: 79 BPM

## 2019-11-07 PROCEDURE — 99214 OFFICE O/P EST MOD 30 MIN: CPT

## 2019-11-07 NOTE — ASSESSMENT
[FreeTextEntry1] : \par 69 year old woman with osteoporosis by DXA at lumbar spine (-2.5) and FN (-3.1) with osteopenia of TH and 1/3 radius. There is history of 2 inches of height loss, slight kyphosis, and several traumatic fractures. The patient has  a remote history of breast cancer and prior treatment with bisphosphonates which she states "did not help"; this likely means that there was no significant increase in BMD although she admits her BMD was stable in the osteopenic range for many years.\par The patient had a recent traumatic nondisplaced fx of her pisiform bone; she is recovering from this. I would resume OP Rx, but in December, with at least 3 months to heal the recent fracture. The patient prefers to resume IV REclast.\par \par Plan:\par 1. pt to do updated chemistries in Dec, then IIV Reclast to be ordered; she will call for results and we will complete the form and referral\par 2. otherwise, labs in Dec 2020 with visit thereafter

## 2019-11-07 NOTE — HISTORY OF PRESENT ILLNESS
[FreeTextEntry1] : 69 year old woman with history of breast cancer at age 40 and early chemotherapy induced menopause.\par The patient has been referred because she is concerned about bone loss, and wants to maintain her health, strength, height and avoid kyphosis.\par She has been treated remotely with alendronate (in the 1990s) ibandronate, and at least 3, possibly 4 IV Reclast infusions. She thinks her last IV Reclast Rx would have been at least 9 years ago.\par She has also been previously treated with raloxifene.\par Maximal height was 5'7" she is currently 5'5".\par There is hx of several traumatic fractures, ulna, tibial plateau, clavicle, distal radius, metatarsal.\par Of note, the patient fell about a week ago and injured her right wrist; she has been wearing an Ace bandage and is concerned there may be a hairline fracture.\par \par at time of initial visit 9/5/19 I ordered films of wrist which showed a nondisplaced fracture of the right pisiform bone.\par the patient was seen on 9/6/19 by Dr. Sandrine Marcial and had a short arm cast placed. Patient wore for a month\par \par today, labs done 9/28/19 were rev:\par glucose; 98\par crea: 0.61\par SPEP: no spike, normal\par BSAP: 11\par CTX: 325\par 25 D: 43.2\par PTH/ca: 33/9.0  alb: 3.9\par \par calcium: 1 in AM\par vitamin D: 1000 IU/day

## 2020-01-08 ENCOUNTER — RX RENEWAL (OUTPATIENT)
Age: 70
End: 2020-01-08

## 2020-01-16 VITALS — TEMPERATURE: 98 F

## 2020-02-03 ENCOUNTER — APPOINTMENT (OUTPATIENT)
Dept: INTERNAL MEDICINE | Facility: CLINIC | Age: 70
End: 2020-02-03
Payer: MEDICARE

## 2020-02-03 VITALS
WEIGHT: 131 LBS | SYSTOLIC BLOOD PRESSURE: 137 MMHG | DIASTOLIC BLOOD PRESSURE: 68 MMHG | TEMPERATURE: 98.4 F | HEART RATE: 78 BPM | BODY MASS INDEX: 21.83 KG/M2 | OXYGEN SATURATION: 96 % | HEIGHT: 65 IN

## 2020-02-03 DIAGNOSIS — M54.9 DORSALGIA, UNSPECIFIED: ICD-10-CM

## 2020-02-03 DIAGNOSIS — Z00.00 ENCOUNTER FOR GENERAL ADULT MEDICAL EXAMINATION W/OUT ABNORMAL FINDINGS: ICD-10-CM

## 2020-02-03 DIAGNOSIS — M81.0 AGE-RELATED OSTEOPOROSIS W/OUT CURRENT PATHOLOGICAL FRACTURE: ICD-10-CM

## 2020-02-03 PROCEDURE — 36415 COLL VENOUS BLD VENIPUNCTURE: CPT

## 2020-02-03 PROCEDURE — G0439: CPT | Mod: 25

## 2020-02-03 PROCEDURE — 99212 OFFICE O/P EST SF 10 MIN: CPT | Mod: 25

## 2020-02-04 ENCOUNTER — TRANSCRIPTION ENCOUNTER (OUTPATIENT)
Age: 70
End: 2020-02-04

## 2020-02-04 LAB
ALBUMIN SERPL ELPH-MCNC: 4.4 G/DL
ALP BLD-CCNC: 68 U/L
ALT SERPL-CCNC: 10 U/L
ANION GAP SERPL CALC-SCNC: 12 MMOL/L
AST SERPL-CCNC: 18 U/L
BASOPHILS # BLD AUTO: 0.05 K/UL
BASOPHILS NFR BLD AUTO: 1.3 %
BILIRUB SERPL-MCNC: 0.4 MG/DL
BUN SERPL-MCNC: 15 MG/DL
CALCIUM SERPL-MCNC: 9.8 MG/DL
CALCIUM SERPL-MCNC: 9.8 MG/DL
CHLORIDE SERPL-SCNC: 102 MMOL/L
CHOLEST SERPL-MCNC: 195 MG/DL
CHOLEST/HDLC SERPL: 2.6 RATIO
CO2 SERPL-SCNC: 27 MMOL/L
CREAT SERPL-MCNC: 0.65 MG/DL
CREAT SPEC-SCNC: 106 MG/DL
EOSINOPHIL # BLD AUTO: 0.02 K/UL
EOSINOPHIL NFR BLD AUTO: 0.5 %
GLUCOSE SERPL-MCNC: 96 MG/DL
HCT VFR BLD CALC: 42.5 %
HDLC SERPL-MCNC: 77 MG/DL
HGB BLD-MCNC: 13.6 G/DL
IMM GRANULOCYTES NFR BLD AUTO: 0.3 %
LDLC SERPL CALC-MCNC: 105 MG/DL
LYMPHOCYTES # BLD AUTO: 1.26 K/UL
LYMPHOCYTES NFR BLD AUTO: 31.9 %
MAN DIFF?: NORMAL
MCHC RBC-ENTMCNC: 32 GM/DL
MCHC RBC-ENTMCNC: 32 PG
MCV RBC AUTO: 100 FL
MICROALBUMIN 24H UR DL<=1MG/L-MCNC: <1.2 MG/DL
MICROALBUMIN/CREAT 24H UR-RTO: NORMAL MG/G
MONOCYTES # BLD AUTO: 0.4 K/UL
MONOCYTES NFR BLD AUTO: 10.1 %
NEUTROPHILS # BLD AUTO: 2.21 K/UL
NEUTROPHILS NFR BLD AUTO: 55.9 %
PARATHYROID HORMONE INTACT: 22 PG/ML
PLATELET # BLD AUTO: 198 K/UL
POTASSIUM SERPL-SCNC: 4.6 MMOL/L
PROT SERPL-MCNC: 6.8 G/DL
RBC # BLD: 4.25 M/UL
RBC # FLD: 12.8 %
SODIUM SERPL-SCNC: 141 MMOL/L
TRIGL SERPL-MCNC: 66 MG/DL
TSH SERPL-ACNC: 2.9 UIU/ML
WBC # FLD AUTO: 3.95 K/UL

## 2020-02-06 ENCOUNTER — APPOINTMENT (OUTPATIENT)
Dept: SLEEP CENTER | Facility: HOSPITAL | Age: 70
End: 2020-02-06

## 2020-02-06 ENCOUNTER — OUTPATIENT (OUTPATIENT)
Dept: OUTPATIENT SERVICES | Facility: HOSPITAL | Age: 70
LOS: 1 days | End: 2020-02-06
Payer: MEDICARE

## 2020-02-06 DIAGNOSIS — G47.33 OBSTRUCTIVE SLEEP APNEA (ADULT) (PEDIATRIC): ICD-10-CM

## 2020-02-06 PROCEDURE — 95810 POLYSOM 6/> YRS 4/> PARAM: CPT

## 2020-02-06 PROCEDURE — 95810 POLYSOM 6/> YRS 4/> PARAM: CPT | Mod: 26

## 2020-02-24 ENCOUNTER — TRANSCRIPTION ENCOUNTER (OUTPATIENT)
Age: 70
End: 2020-02-24

## 2020-03-06 ENCOUNTER — APPOINTMENT (OUTPATIENT)
Dept: SLEEP CENTER | Facility: HOSPITAL | Age: 70
End: 2020-03-06

## 2020-03-06 ENCOUNTER — OUTPATIENT (OUTPATIENT)
Dept: OUTPATIENT SERVICES | Facility: HOSPITAL | Age: 70
LOS: 1 days | End: 2020-03-06
Payer: MEDICARE

## 2020-03-06 DIAGNOSIS — G47.33 OBSTRUCTIVE SLEEP APNEA (ADULT) (PEDIATRIC): ICD-10-CM

## 2020-03-06 PROCEDURE — 95811 POLYSOM 6/>YRS CPAP 4/> PARM: CPT | Mod: 26

## 2020-03-06 PROCEDURE — 95811 POLYSOM 6/>YRS CPAP 4/> PARM: CPT

## 2020-03-09 ENCOUNTER — RX RENEWAL (OUTPATIENT)
Age: 70
End: 2020-03-09

## 2020-04-09 ENCOUNTER — APPOINTMENT (OUTPATIENT)
Dept: OTOLARYNGOLOGY | Facility: CLINIC | Age: 70
End: 2020-04-09
Payer: MEDICARE

## 2020-04-09 PROCEDURE — G2012 BRIEF CHECK IN BY MD/QHP: CPT

## 2020-04-21 ENCOUNTER — RX RENEWAL (OUTPATIENT)
Age: 70
End: 2020-04-21

## 2020-07-08 RX ORDER — FLUTICASONE PROPIONATE 50 UG/1
50 SPRAY, METERED NASAL DAILY
Qty: 1 | Refills: 0 | Status: ACTIVE | COMMUNITY
Start: 2019-03-05 | End: 1900-01-01

## 2020-09-04 ENCOUNTER — LABORATORY RESULT (OUTPATIENT)
Age: 70
End: 2020-09-04

## 2020-09-04 ENCOUNTER — APPOINTMENT (OUTPATIENT)
Dept: INTERNAL MEDICINE | Facility: CLINIC | Age: 70
End: 2020-09-04
Payer: MEDICARE

## 2020-09-04 VITALS
WEIGHT: 133 LBS | HEART RATE: 69 BPM | HEIGHT: 65 IN | BODY MASS INDEX: 22.16 KG/M2 | SYSTOLIC BLOOD PRESSURE: 134 MMHG | OXYGEN SATURATION: 97 % | TEMPERATURE: 98.6 F | DIASTOLIC BLOOD PRESSURE: 86 MMHG

## 2020-09-04 DIAGNOSIS — D17.9 BENIGN LIPOMATOUS NEOPLASM, UNSPECIFIED: ICD-10-CM

## 2020-09-04 DIAGNOSIS — Z23 ENCOUNTER FOR IMMUNIZATION: ICD-10-CM

## 2020-09-04 DIAGNOSIS — I10 ESSENTIAL (PRIMARY) HYPERTENSION: ICD-10-CM

## 2020-09-04 DIAGNOSIS — E78.5 HYPERLIPIDEMIA, UNSPECIFIED: ICD-10-CM

## 2020-09-04 DIAGNOSIS — Z11.59 ENCOUNTER FOR SCREENING FOR OTHER VIRAL DISEASES: ICD-10-CM

## 2020-09-04 PROCEDURE — 99215 OFFICE O/P EST HI 40 MIN: CPT | Mod: 25

## 2020-09-04 PROCEDURE — 93000 ELECTROCARDIOGRAM COMPLETE: CPT

## 2020-09-04 RX ORDER — ZOSTER VACCINE RECOMBINANT, ADJUVANTED 50 MCG/0.5
50 KIT INTRAMUSCULAR
Qty: 1 | Refills: 1 | Status: ACTIVE | COMMUNITY
Start: 2020-09-04 | End: 1900-01-01

## 2020-09-05 ENCOUNTER — TRANSCRIPTION ENCOUNTER (OUTPATIENT)
Age: 70
End: 2020-09-05

## 2020-09-05 LAB
25(OH)D3 SERPL-MCNC: 46.2 NG/ML
ALBUMIN SERPL ELPH-MCNC: 4.2 G/DL
ALP BLD-CCNC: 72 U/L
ALT SERPL-CCNC: 13 U/L
ANION GAP SERPL CALC-SCNC: 11 MMOL/L
AST SERPL-CCNC: 17 U/L
B BURGDOR IGG+IGM SER QL IB: NORMAL
BASOPHILS # BLD AUTO: 0.05 K/UL
BASOPHILS NFR BLD AUTO: 0.9 %
BILIRUB SERPL-MCNC: 0.5 MG/DL
BUN SERPL-MCNC: 14 MG/DL
CALCIUM SERPL-MCNC: 10.2 MG/DL
CHLORIDE SERPL-SCNC: 103 MMOL/L
CO2 SERPL-SCNC: 27 MMOL/L
CREAT SERPL-MCNC: 0.69 MG/DL
EOSINOPHIL # BLD AUTO: 0.03 K/UL
EOSINOPHIL NFR BLD AUTO: 0.6 %
GLUCOSE SERPL-MCNC: 112 MG/DL
HCT VFR BLD CALC: 41.1 %
HGB BLD-MCNC: 13.5 G/DL
IMM GRANULOCYTES NFR BLD AUTO: 0.2 %
LYMPHOCYTES # BLD AUTO: 1.44 K/UL
LYMPHOCYTES NFR BLD AUTO: 27.3 %
MAN DIFF?: NORMAL
MCHC RBC-ENTMCNC: 31.9 PG
MCHC RBC-ENTMCNC: 32.8 GM/DL
MCV RBC AUTO: 97.2 FL
MONOCYTES # BLD AUTO: 0.4 K/UL
MONOCYTES NFR BLD AUTO: 7.6 %
NEUTROPHILS # BLD AUTO: 3.35 K/UL
NEUTROPHILS NFR BLD AUTO: 63.4 %
PLATELET # BLD AUTO: 228 K/UL
POTASSIUM SERPL-SCNC: 4.4 MMOL/L
PROT SERPL-MCNC: 6.8 G/DL
RBC # BLD: 4.23 M/UL
RBC # FLD: 12.4 %
SARS-COV-2 IGG SERPL IA-ACNC: 0.08 INDEX
SARS-COV-2 IGG SERPL QL IA: NEGATIVE
SARS-COV-2 N GENE NPH QL NAA+PROBE: NOT DETECTED
SODIUM SERPL-SCNC: 141 MMOL/L
WBC # FLD AUTO: 5.28 K/UL

## 2020-09-08 DIAGNOSIS — R91.8 OTHER NONSPECIFIC ABNORMAL FINDING OF LUNG FIELD: ICD-10-CM

## 2020-09-09 ENCOUNTER — APPOINTMENT (OUTPATIENT)
Dept: OTOLARYNGOLOGY | Facility: CLINIC | Age: 70
End: 2020-09-09
Payer: MEDICARE

## 2020-09-09 VITALS
SYSTOLIC BLOOD PRESSURE: 145 MMHG | DIASTOLIC BLOOD PRESSURE: 69 MMHG | BODY MASS INDEX: 21.99 KG/M2 | HEART RATE: 61 BPM | WEIGHT: 132 LBS | TEMPERATURE: 96.8 F | HEIGHT: 65 IN

## 2020-09-09 DIAGNOSIS — R05 COUGH: ICD-10-CM

## 2020-09-09 PROCEDURE — 31575 DIAGNOSTIC LARYNGOSCOPY: CPT

## 2020-09-09 PROCEDURE — 99214 OFFICE O/P EST MOD 30 MIN: CPT | Mod: 25

## 2020-09-09 RX ORDER — YEAST,DRIED (S. CEREVISIAE)
POWDER (GRAM) ORAL
Refills: 0 | Status: COMPLETED | COMMUNITY
End: 2020-09-09

## 2020-10-01 PROBLEM — R05 CHRONIC COUGH: Status: ACTIVE | Noted: 2020-09-04

## 2020-10-01 NOTE — ASSESSMENT
[FreeTextEntry1] : Ms. Hogan was evaluated for the following issues today:\par \par 1.) sleep is more restful now that she is using CPAP 8 cm H2O via full face mask.\par --> If nose is itchy, make sure CPAP has humidification.\par She will f/up with Dr. Pruett at [facility]\par --> continue CPAP\par \par 2.) dysphagia and excess salivation\par No obstruction seen on exam of laryngopharynx.\par --> modified barium swallow \par --> swallow evaluation\par \par

## 2020-10-01 NOTE — PHYSICAL EXAM
[] : septum deviated to the left [Midline] : trachea located in midline position [Laryngoscopy Performed] : laryngoscopy was performed, see procedure section for findings [Normal] : no neck adenopathy [FreeTextEntry1] : No hoarseness. [de-identified] : tongue with prominent shaggy epithelium cover [de-identified] : 0-1+ bilateral.

## 2020-10-01 NOTE — HISTORY OF PRESENT ILLNESS
[de-identified] : Ms. Hogan was seen in f/up.\par \par She is getting used to the CPAP and mask (fullface).  Feels more rested when she wakes in AM.\par She moves a lot in sleep and feels nose gets itchy\par She is seeing Dr. Pruett at \[facility]\"". She had almost 1/2 the events as central apnea.\par \par She has dry cough all summer and feels that saliva collects in her mouth.  When she swallows, the bolus and saliva don’t go down and is coughed up.  Her swallowing feels sluggish.  Throat feels dry and sticky.\par No throat pain, voice change or neck swelling.  Denies acid in throat and heartburn.\par \par \par SLEEP STUDY (02/06/2020) at [facility]:\par - Mild to moderate LISA. \par - Overall AHI 20.4 (AASM)/17/4 (CMS), with REM AHI 68.2\par - In the study 23 obstructive apneas, 40 central apneas and 11 mixed; Hypopneas 29 (AASM)/14 (CMS)\par - O2 sat mean 91%; lowest 78%\par - Arousal index 13.3 (half were respiratory related)\par - PLM index zero\par - ECG with predominant NSR\par \par CPAP titration study (3/06/2020) at [facility]:\par - CPAP at 8 cm H2O

## 2020-10-01 NOTE — PROCEDURE
[de-identified] : \par Indication:  dysphagia\par -Verbal consent was obtained from patient prior to procedure.\par -Cristino-Synephrine and lidocaine 2% spray applied to the nasal cavities.\par Flexible laryngoscopy was performed via right   nostril and revealed the following:\par   -- Nasopharynx had no mass or exudate.\par   -- Base of tongue was symmetric and not enlarged.\par   -- Vallecula was clear\par   -- Epiglottis, both aryepiglottic folds and both false vocal folds were normal\par   -- Arytenoids both with mild edema and erythema \par   -- True vocal folds were fully mobile and without lesions. \par   -- Post cricoid area was clear.\par   -- Interarytenoid edema was  present.\par   -- No lesions in laryngopharynx\par \par The patient tolerated the procedure well.\par

## 2020-10-14 ENCOUNTER — OUTPATIENT (OUTPATIENT)
Dept: OUTPATIENT SERVICES | Facility: HOSPITAL | Age: 70
LOS: 1 days | End: 2020-10-14
Payer: MEDICARE

## 2020-10-14 ENCOUNTER — APPOINTMENT (OUTPATIENT)
Dept: RADIOLOGY | Facility: HOSPITAL | Age: 70
End: 2020-10-14
Payer: MEDICARE

## 2020-10-14 PROCEDURE — 74230 X-RAY XM SWLNG FUNCJ C+: CPT | Mod: 26

## 2020-10-14 PROCEDURE — 74230 X-RAY XM SWLNG FUNCJ C+: CPT

## 2020-10-21 ENCOUNTER — APPOINTMENT (OUTPATIENT)
Dept: OTOLARYNGOLOGY | Facility: CLINIC | Age: 70
End: 2020-10-21

## 2020-11-04 ENCOUNTER — APPOINTMENT (OUTPATIENT)
Dept: OTOLARYNGOLOGY | Facility: CLINIC | Age: 70
End: 2020-11-04
Payer: MEDICARE

## 2020-11-04 DIAGNOSIS — G47.33 OBSTRUCTIVE SLEEP APNEA (ADULT) (PEDIATRIC): ICD-10-CM

## 2020-11-04 DIAGNOSIS — R13.10 DYSPHAGIA, UNSPECIFIED: ICD-10-CM

## 2020-11-04 PROCEDURE — 99214 OFFICE O/P EST MOD 30 MIN: CPT | Mod: 95

## 2020-11-29 PROBLEM — G47.33 OBSTRUCTIVE SLEEP APNEA OF ADULT: Status: ACTIVE | Noted: 2019-02-27

## 2020-11-29 NOTE — ASSESSMENT
[FreeTextEntry1] : Ms. Hogan was evaluated for the following issues today:\par \par 1.) sleep is restful on CPAP 8 cm H2O via full face mask.\par She will f/up with Dr. Pruett at [facility] regarding fit of the mask\par --> continue CPAP\par \par 2.) dysphagia and excess salivation is almost resolved\par No obstruction seen on exam of laryngopharynx \par Modified barium swallow showed mild stasis with solids that resolved with liquid wash.\par Not sure of the significance of the esophageal indentation by the aortic arch\par --> continue slow eating with liquid wash after each solid swallow\par \par Return as needed\par \par \par

## 2020-11-29 NOTE — HISTORY OF PRESENT ILLNESS
[de-identified] : TELEHEALTH VISIT\par  Visit initiated at patient request. This audio / visual (using [employer]) visit is occurring during the  state of emergency due to COVID-19. Governmental regulation is restricting travel, in-person contact, recommend use of remote activities and telemedicine whenever possible. I discussed with patient the limitations of telemedicine encounters, including risks associated with the technology platform, technical difficulties, data security, and a limited physical exam. There is also a limitation in performing diagnostic procedures and patient may need further testing and workup to arrive at a diagnosis and treatment plan. We discussed this will be billed as a visit. \par Ms Hogan understood and elected to proceed at 6:00 pm on 11/04/2020.\par Patient location: Home  in [place], [place].  Patient was participant with her daughter.\par Physician location:  Office of Dr. Ford at [address] [place] [address] in [place], [place]\par ----------------------------------------------------------------------------------------\par ----------------------------------------------------------------------------------------\par She is using the CPAP nightly and feels more rested when she wakes up.\par Changed to a gel mask (fullface) but not getting good seal unless straps very tight.\par In AM, machine reads AHI 1-5.\par Overall she is happy with results of CPAP use.\par She is seeing Dr. Pruett at [facility]. She had almost 1/2 the events as central apnea.\par \par Resolution of dry cough and saliva collecting in mouth over a month ago.\par She has only had one episode when feels food bolus and saliva not going down in past 2 months.\par Tries to drink more liquids while eating, after had modified barium swallow.\par No throat pain, voice change, neck swelling, acid in throat or heartburn.\par \par \par MODIFIED BARIUM SWALLOW (10/14/2020) at [facility]:\par - Penetration with thin liquids but no aspiration\par - Mild stasis of solid food which improved after liquid wash.\par - Extrinsic indentation on the esophagus by aortic arch.\par - Degenerative changes of the c-spine\par (Images were reviewed.) \par \par \par SLEEP STUDY (02/06/2020) at [facility]:\par - Mild to moderate LISA. \par - Overall AHI 20.4 (AASM)/17/4 (CMS), with REM AHI 68.2\par - In the study 23 obstructive apneas, 40 central apneas and 11 mixed; Hypopneas 29 (AASM)/14 (CMS)\par - O2 sat mean 91%; lowest 78%\par - Arousal index 13.3 (half were respiratory related)\par - PLM index zero\par - ECG with predominant NSR\par \par CPAP titration study (3/06/2020) at [facility]:\par - CPAP at 8 cm H2O\par

## 2022-04-11 PROBLEM — Z11.59 SCREENING FOR VIRAL DISEASE: Status: ACTIVE | Noted: 2020-09-04

## 2022-10-24 ENCOUNTER — HOSPITAL ENCOUNTER (EMERGENCY)
Facility: HOSPITAL | Age: 72
Discharge: HOME | End: 2022-10-24
Attending: EMERGENCY MEDICINE
Payer: COMMERCIAL

## 2022-10-24 VITALS
SYSTOLIC BLOOD PRESSURE: 167 MMHG | DIASTOLIC BLOOD PRESSURE: 75 MMHG | HEART RATE: 81 BPM | TEMPERATURE: 96.8 F | RESPIRATION RATE: 19 BRPM | OXYGEN SATURATION: 96 %

## 2022-10-24 DIAGNOSIS — M54.50 RIGHT-SIDED LOW BACK PAIN WITHOUT SCIATICA, UNSPECIFIED CHRONICITY: Primary | ICD-10-CM

## 2022-10-24 PROCEDURE — 99284 EMERGENCY DEPT VISIT MOD MDM: CPT | Performed by: EMERGENCY MEDICINE

## 2022-10-24 RX ORDER — DIAZEPAM 5 MG/1
5 TABLET ORAL ONCE
Status: COMPLETED | OUTPATIENT
Start: 2022-10-24 | End: 2022-10-24

## 2022-10-24 RX ORDER — KETOROLAC TROMETHAMINE 30 MG/ML
15 INJECTION, SOLUTION INTRAMUSCULAR; INTRAVENOUS ONCE
Status: COMPLETED | OUTPATIENT
Start: 2022-10-24 | End: 2022-10-24

## 2022-10-24 RX ORDER — NAPROXEN 375 MG/1
375 TABLET ORAL 2 TIMES DAILY WITH MEALS
Qty: 20 TABLET | Refills: 0 | Status: SHIPPED | OUTPATIENT
Start: 2022-10-24

## 2022-10-24 RX ORDER — LIDOCAINE 50 MG/G
2 PATCH TOPICAL ONCE
Status: DISCONTINUED | OUTPATIENT
Start: 2022-10-24 | End: 2022-10-24 | Stop reason: HOSPADM

## 2022-10-24 RX ORDER — DIAZEPAM 5 MG/1
5 TABLET ORAL EVERY 12 HOURS PRN
Qty: 12 TABLET | Refills: 0 | Status: SHIPPED | OUTPATIENT
Start: 2022-10-24 | End: 2022-10-30

## 2022-10-24 RX ORDER — ACETAMINOPHEN 325 MG/1
650 TABLET ORAL ONCE
Status: COMPLETED | OUTPATIENT
Start: 2022-10-24 | End: 2022-10-24

## 2022-10-24 RX ORDER — LIDOCAINE 50 MG/G
1 PATCH TOPICAL DAILY
Qty: 30 PATCH | Refills: 0 | Status: SHIPPED | OUTPATIENT
Start: 2022-10-24

## 2022-10-24 RX ADMIN — LIDOCAINE 5% 2 PATCH: 700 PATCH TOPICAL at 15:23

## 2022-10-24 RX ADMIN — ACETAMINOPHEN 650 MG: 325 TABLET, FILM COATED ORAL at 15:23

## 2022-10-24 RX ADMIN — DIAZEPAM 5 MG: 5 TABLET ORAL at 15:23

## 2022-10-24 RX ADMIN — KETOROLAC TROMETHAMINE 15 MG: 30 INJECTION, SOLUTION INTRAMUSCULAR at 15:23

## 2022-10-24 NOTE — DISCHARGE INSTRUCTIONS
Do not drive or operate heavy machinery while taking Valium  Return to the emergency department for any new or worsening symptoms

## 2022-10-24 NOTE — ED PROVIDER NOTES
Pt Name: Gabriel Watson  MRN: [id number]  [place] 1950  Age/Sex: 74 y o  female  Date of evaluation: 10/24/2022  PCP: No primary care provider on file  CHIEF COMPLAINT    Chief Complaint   Patient presents with   • Back Pain     Pt presents for c/o muscle spasms in her back x1 week, acute on chronic back pain  HPI and MDM    74 y o  female presenting with muscle spasms in her back for the last 3-4 days  Patient states she has had this in the past as well, occurred 3 years ago  States she did not fall, no injuries  No history of back surgeries  No nausea or vomiting  No fevers or chills, no numbness or tingling or weakness  No bladder/bowel incontinence or retention  No red flag signs for back pain  Patient feeling better upon re-evaluation  Would like to go home now  Does have family member able to drive her  Provided prescription for Valium, naproxen, lidocaine patches  Advise rest, PCP follow-up, referred to [facility]   Return precautions discussed  Medications   lidocaine (LIDODERM) 5 % patch 2 patch (2 patches Topical Medication Applied 10/24/22 1523)   diazepam (VALIUM) tablet 5 mg (5 mg Oral Given 10/24/22 1523)   ketorolac (TORADOL) injection 15 mg (15 mg Intramuscular Given 10/24/22 1523)   acetaminophen (TYLENOL) tablet 650 mg (650 mg Oral Given 10/24/22 1523)         Past Medical and Surgical History    Past Medical History:   Diagnosis Date   • Cancer [facility])     breast       Past Surgical History:   Procedure Laterality Date   • MASTECTOMY, RADICAL Right        History reviewed  No pertinent family history  Social History     Tobacco Use   • Smoking status: Never Smoker   • Smokeless tobacco: Never Used   Substance Use Topics   • Alcohol use: Never   • Drug use: Never           Allergies    Allergies   Allergen Reactions   • Penicillins Rash       Home Medications    Prior to Admission medications    Medication Sig Start Date End Date Taking? Authorizing Provider   diazepam (VALIUM) 5 mg tablet Take 1 tablet (5 mg total) by mouth every 6 (six) hours as needed for muscle spasms for up to 3 days 6/9/19 6/15/19  Paty Posey MD   lidocaine (LIDODERM) 5 % Apply 1 patch topically daily for 15 doses Remove & Discard patch within 12 hours or as directed by MD 6/10/19 6/25/19  AMILCAR Brooks   methocarbamol (ROBAXIN) 500 mg tablet Take 1 tablet (500 mg total) by mouth 2 (two) times a day 6/9/19   Paty Posey MD   naproxen (NAPROSYN) 500 mg tablet Take 1 tablet (500 mg total) by mouth 2 (two) times a day as needed for moderate pain for up to 7 days 6/9/19 6/16/19  Paty Posey MD           Review of Systems    Review of Systems   Constitutional: Negative for chills and fever  Eyes: Negative for photophobia and pain  Respiratory: Negative for cough and shortness of breath  Cardiovascular: Negative for chest pain and palpitations  Gastrointestinal: Negative for nausea and vomiting  Genitourinary: Negative for flank pain  Musculoskeletal: Positive for back pain  Negative for neck pain  Neurological: Negative for weakness and numbness  Physical Exam      ED Triage Vitals   Temperature Pulse Respirations Blood Pressure SpO2   10/24/22 1357 10/24/22 1357 10/24/22 1357 10/24/22 1358 10/24/22 1357   (!) 96 8 °F (36 °C) 81 19 167/75 96 %      Temp Source Heart Rate Source Patient Position - Orthostatic VS BP Location FiO2 (%)   10/24/22 1357 10/24/22 1357 10/24/22 1357 10/24/22 1357 --   Tympanic Monitor Sitting Left arm       Pain Score       10/24/22 1523       8               Physical Exam  Constitutional:       General: She is not in acute distress  Appearance: She is not ill-appearing  HENT:      Head: Normocephalic and atraumatic  Nose: Nose normal       Mouth/Throat:      Mouth: Mucous membranes are moist    Eyes:      Extraocular Movements: Extraocular movements intact        Pupils: Pupils are equal, round, and reactive to light  Cardiovascular:      Rate and Rhythm: Normal rate and regular rhythm  Pulmonary:      Effort: No respiratory distress  Breath sounds: Normal breath sounds  No wheezing  Abdominal:      General: There is no distension  Tenderness: There is no abdominal tenderness  Musculoskeletal:         General: No swelling or deformity  Normal range of motion  Cervical back: Normal range of motion and neck supple  Comments: No midline spinal ttp or step-offs  Mild right para-lumbar muscular ttp  Skin:     General: Skin is warm  Findings: No erythema  Neurological:      Mental Status: She is alert and oriented to person, place, and time  Mental status is at baseline  Sensory: No sensory deficit  Motor: No weakness  Diagnostic Results      Labs:    Results Reviewed     None          All labs reviewed and utilized in the medical decision making process    Radiology:    No orders to display       All radiology studies independently viewed by me and interpreted by the radiologist     Procedure    Procedures        FINAL IMPRESSION    Final diagnoses:   Right-sided low back pain without sciatica, unspecified chronicity         DISPOSITION    Time reflects when diagnosis was documented in both MDM as applicable and the Disposition within this note     Time User Action Codes Description Comment    10/24/2022  4:01 PM Whitley Conte Add [S27 27] Right-sided low back pain without sciatica, unspecified chronicity       ED Disposition     ED Disposition   Discharge    Condition   Stable    Date/Time   Mon Oct 24, 2022  4:01 PM    Comment   [place] discharge to home/self care  Follow-up Information    None           PATIENT REFERRED TO:    No follow-up provider specified      DISCHARGE MEDICATIONS:    Discharge Medication List as of 10/24/2022  4:08 PM      START taking these medications    Details   diazepam (VALIUM) 5 mg tablet Take 1 tablet (5 mg total) by mouth every 12 (twelve) hours as needed for muscle spasms for up to 6 days, Starting Mon 10/24/2022, Until Sun 10/30/2022 at 2359, Print         CONTINUE these medications which have NOT CHANGED    Details   methocarbamol (ROBAXIN) 500 mg tablet Take 1 tablet (500 mg total) by mouth 2 (two) times a day, Starting Sun 6/9/2019, Print      naproxen (NAPROSYN) 500 mg tablet Take 1 tablet (500 mg total) by mouth 2 (two) times a day as needed for moderate pain for up to 7 days, Starting Sun 6/9/2019, Until Sun 6/16/2019, Print      lidocaine (LIDODERM) 5 % Apply 1 patch topically daily for 15 doses Remove & Discard patch within 12 hours or as directed by MD, Starting Mon 6/10/2019, Until Tue 6/25/2019, Normal                      [facility], DO        This note was partially completed using voice recognition technology, and was scanned for gross errors; however some errors may still exist  Please contact the author with any questions or requests for clarification        Swapna Nelson,   10/24/22 [id number]

## 2022-10-25 ENCOUNTER — TELEPHONE (OUTPATIENT)
Dept: PHYSICAL THERAPY | Facility: OTHER | Age: 72
End: 2022-10-25

## 2022-11-01 NOTE — TELEPHONE ENCOUNTER
Call placed to the patient per [facility] referral     Call appeared to be answered but when I asked to speak to the patient no one answered and then the call was disconnected  This the 2nd attempt to reach the patient  Will defer per protocol

## 2022-11-02 ENCOUNTER — APPOINTMENT (EMERGENCY)
Dept: CT IMAGING | Facility: HOSPITAL | Age: 72
End: 2022-11-02

## 2022-11-02 ENCOUNTER — HOSPITAL ENCOUNTER (EMERGENCY)
Facility: HOSPITAL | Age: 72
Discharge: HOME | End: 2022-11-02
Attending: EMERGENCY MEDICINE

## 2022-11-02 VITALS
RESPIRATION RATE: 17 BRPM | TEMPERATURE: 96.8 F | SYSTOLIC BLOOD PRESSURE: 132 MMHG | OXYGEN SATURATION: 94 % | HEART RATE: 88 BPM | DIASTOLIC BLOOD PRESSURE: 60 MMHG

## 2022-11-02 DIAGNOSIS — G89.29 ACUTE EXACERBATION OF CHRONIC LOW BACK PAIN: Primary | ICD-10-CM

## 2022-11-02 DIAGNOSIS — M54.50 ACUTE EXACERBATION OF CHRONIC LOW BACK PAIN: Primary | ICD-10-CM

## 2022-11-02 DIAGNOSIS — N28.1 RENAL CYST: ICD-10-CM

## 2022-11-02 DIAGNOSIS — S22.000A COMPRESSION FRACTURE OF BODY OF THORACIC VERTEBRA (HCC): ICD-10-CM

## 2022-11-02 RX ORDER — ONDANSETRON 4 MG/1
4 TABLET, ORALLY DISINTEGRATING ORAL EVERY 6 HOURS PRN
Qty: 12 TABLET | Refills: 0 | Status: SHIPPED | OUTPATIENT
Start: 2022-11-02

## 2022-11-02 RX ORDER — OXYCODONE HYDROCHLORIDE AND ACETAMINOPHEN 5; 325 MG/1; MG/1
1 TABLET ORAL EVERY 6 HOURS PRN
Qty: 3 TABLET | Refills: 0 | Status: SHIPPED | OUTPATIENT
Start: 2022-11-02

## 2022-11-02 RX ORDER — HYDROMORPHONE HCL/PF 1 MG/ML
1 SYRINGE (ML) INJECTION ONCE
Status: COMPLETED | OUTPATIENT
Start: 2022-11-02 | End: 2022-11-02

## 2022-11-02 RX ORDER — ONDANSETRON 4 MG/1
4 TABLET, ORALLY DISINTEGRATING ORAL ONCE
Status: COMPLETED | OUTPATIENT
Start: 2022-11-02 | End: 2022-11-02

## 2022-11-02 RX ADMIN — HYDROMORPHONE HYDROCHLORIDE 1 MG: 1 INJECTION, SOLUTION INTRAMUSCULAR; INTRAVENOUS; SUBCUTANEOUS at 16:31

## 2022-11-02 RX ADMIN — ONDANSETRON 4 MG: 4 TABLET, ORALLY DISINTEGRATING ORAL at 17:22

## 2022-11-02 NOTE — ED PROVIDER NOTES
Pt Name: Jerrica Marshall  MRN: [id number]  [place] 1950  Age/Sex: 74 y o  female  Date of evaluation: 11/2/2022  PCP: No primary care provider on file  CHIEF COMPLAINT    Chief Complaint   Patient presents with   • Back Pain     Pt reports lower back pain since last week  Per family "she needs dilaudid" Was seen here last week for the same thing          HPI    74 y o  female presenting with approximately 10 days of low back pain  Patient has a history of prior episodes of low back pain, last was about 3 years ago, lasted for several weeks, resolved after following up with her doctor in [place]  Patient states she was in her normal state of health, has been moving boxes and doing yd work in preparation for selling house, thinks she may have overdone it  She began with a twinge of pain to the right low back which is grown steadily worse  Patient was seen about a week ago for this episode of back pain, treated with Toradol, Valium, methocarbamol with mild relief of symptoms over that have worsened again  The pain is currently dull, severe, in the right low back, radiating throughout the back, worse with walking moving and better at rest   She denies numbness, weakness, incontinence, direct trauma, fevers, unexpected weight loss, other symptoms  Patient does have a history of breast cancer which she says has been in remission since the 1980s  HPI      Past Medical and Surgical History     Past Medical History:   Diagnosis Date   • Cancer [facility])     breast   • Hypertension        Past Surgical History:   Procedure Laterality Date   • MASTECTOMY, RADICAL Right        History reviewed  No pertinent family history      Social History     Tobacco Use   • Smoking status: Never Smoker   • Smokeless tobacco: Never Used   Substance Use Topics   • Alcohol use: Never   • Drug use: Never           Allergies    Allergies   Allergen Reactions   • Penicillins Rash       Home Medications    Prior to Admission medications    Medication Sig Start Date End Date Taking? Authorizing Provider   diazepam (VALIUM) 5 mg tablet Take 1 tablet (5 mg total) by mouth every 12 (twelve) hours as needed for muscle spasms for up to 6 days 10/24/22 10/30/22  Shaina Montanez, DO   lidocaine (Lidoderm) 5 % Apply 1 patch topically daily Remove & Discard patch within 12 hours or as directed by MD 10/24/22   Lashell Carballo, DO   methocarbamol (ROBAXIN) 500 mg tablet Take 1 tablet (500 mg total) by mouth 2 (two) times a day 6/9/19   Lee Hitchcock MD   naproxen (NAPROSYN) 375 mg tablet Take 1 tablet (375 mg total) by mouth 2 (two) times a day with meals 10/24/22   Lashell Carballo, DO           Review of Systems    Review of Systems   Constitutional: Negative for activity change, chills and fever  HENT: Negative for drooling and facial swelling  Eyes: Negative for pain, discharge and visual disturbance  Respiratory: Negative for apnea, cough, chest tightness, shortness of breath and wheezing  Cardiovascular: Negative for chest pain and leg swelling  Gastrointestinal: Negative for abdominal pain, constipation, diarrhea, nausea and vomiting  Genitourinary: Negative for difficulty urinating, dysuria and urgency  Musculoskeletal: Positive for back pain  Negative for arthralgias and gait problem  Skin: Negative for color change and rash  Neurological: Negative for dizziness, speech difficulty, weakness and headaches  Psychiatric/Behavioral: Negative for agitation, behavioral problems and confusion  All other systems reviewed and negative      Physical Exam      ED Triage Vitals   Temperature Pulse Respirations Blood Pressure SpO2   11/02/22 1303 11/02/22 1303 11/02/22 1303 11/02/22 1303 11/02/22 1303   (!) 96 8 °F (36 °C) 85 19 165/74 97 %      Temp Source Heart Rate Source Patient Position - Orthostatic VS BP Location FiO2 (%)   11/02/22 1303 11/02/22 1303 11/02/22 1303 11/02/22 1303 --   Tympanic Monitor Sitting Left arm Pain Score       11/02/22 1631       10 - Worst Possible Pain               Physical Exam  Vitals and nursing note reviewed  Constitutional:       General: She is not in acute distress  Appearance: She is well-developed and normal weight  She is not ill-appearing, toxic-appearing or diaphoretic  HENT:      Head: Normocephalic and atraumatic  Right Ear: External ear normal       Left Ear: External ear normal    Eyes:      Conjunctiva/sclera: Conjunctivae normal       Pupils: Pupils are equal, round, and reactive to light  Cardiovascular:      Rate and Rhythm: Normal rate and regular rhythm  Heart sounds: Normal heart sounds  Pulmonary:      Effort: Pulmonary effort is normal  No respiratory distress  Breath sounds: No wheezing or rales  Abdominal:      General: There is no distension  Palpations: Abdomen is soft  Tenderness: There is no abdominal tenderness  There is no guarding or rebound  Musculoskeletal:         General: No deformity  Normal range of motion  Cervical back: Normal range of motion and neck supple  Skin:     General: Skin is warm and dry  Capillary Refill: Capillary refill takes less than 2 seconds  Findings: No erythema or rash  Neurological:      Mental Status: She is alert and oriented to person, place, and time  Cranial Nerves: No cranial nerve deficit  Sensory: No sensory deficit  Motor: No weakness  Coordination: Coordination normal       Gait: Gait normal       Comments: 5/5 strength in bilateral lower extremities, normal sensation  Psychiatric:         Behavior: Behavior normal          Thought Content:  Thought content normal          Judgment: Judgment normal               Diagnostic Results      Labs:    Results Reviewed     None          All labs reviewed and utilized in the medical decision making process    Radiology:    CT abdomen pelvis wo contrast   Final Result      No acute inflammatory changes in the abdomen or pelvis  Workstation performed: [id number]             All radiology studies independently viewed by me and interpreted by the radiologist     Procedure    Procedures        ED Course of Care and Re-Assessments      Pain resolved with hydromorphone  Medications   HYDROmorphone (DILAUDID) injection 1 mg (1 mg Intramuscular Given 11/2/22 1631)   ondansetron (ZOFRAN-ODT) dispersible tablet 4 mg (4 mg Oral Given 11/2/22 1722)           FINAL IMPRESSION    Final diagnoses:   Acute exacerbation of chronic low back pain   Renal cyst   Compression fracture of body of thoracic vertebra (HCC) - chronic         DISPOSITION/PLAN    Presentation as above felt most likely consistent with acute exacerbation chronic low back pain, likely musculoskeletal etiology and provoked by recent increased activity lifting boxes  CT performed, showed no fracture or other concerning findings, renal cyst discussed with patient, previously known to her, also discussed chronic appearing compression fracture  Treated symptomatically, referred to spine and pain as well as back to primary care doctor, discharged with strict return precautions  Time reflects when diagnosis was documented in both MDM as applicable and the Disposition within this note     Time User Action Codes Description Comment    11/2/2022  5:51 PM Bahman Corral Add [M54 50,  G89 29] Acute exacerbation of chronic low back pain     11/2/2022  5:51 PM Augusto So T Add [N28 1] Renal cyst     11/2/2022  5:51 PM Augusto So T Add [S22 000A] Compression fracture of body of thoracic vertebra ([address] )     11/2/2022  5:51 PM Augusto So T Modify [S22 000A] Compression fracture of body of thoracic vertebra [facility]) chronic      ED Disposition     ED Disposition   Discharge    Condition   Stable    Date/Time   Wed Nov 2, 2022  5:51 PM    Comment   [address]en discharge to home/self care                 Follow-up Information     Follow up With Specialties Details Why Contact Info Additional [address] [facility] Emergency Department Emergency Medicine Go to  If symptoms worsen [address] [place] [address] [facility] Emergency Department, [address] [place], [place], [address] [facility] Pain Medicine Call in 1 day To discuss your chronic back pain and further care [address] [facility] [address] [address], [address] [phone]    Your primary care doctor  Call in 1 day To discuss this visit and schedule close outpatient follow-up  PATIENT REFERRED TO:    [address] [facility] Emergency Department  [address] [facility] [address] [phone]  Go to   If symptoms worsen    [facility]  [address] [place] [address]  [phone]  Call in 1 day  To discuss your chronic back pain and further care    Your primary care doctor    Call in 1 day  To discuss this visit and schedule close outpatient follow-up        DISCHARGE MEDICATIONS:    Discharge Medication List as of 11/2/2022  5:56 PM      START taking these medications    Details   ondansetron (ZOFRAN-ODT) 4 mg disintegrating tablet Take 1 tablet (4 mg total) by mouth every 6 (six) hours as needed for nausea or vomiting, Starting Wed 11/2/2022, Normal      oxyCODONE-acetaminophen (PERCOCET) 5-325 mg per tablet Take 1 tablet by mouth every 6 (six) hours as needed for severe pain for up to 3 doses Max Daily Amount: 4 tablets, Starting Wed 11/2/2022, Normal         CONTINUE these medications which have NOT CHANGED    Details   diazepam (VALIUM) 5 mg tablet Take 1 tablet (5 mg total) by mouth every 12 (twelve) hours as needed for muscle spasms for up to 6 days, Starting Mon 10/24/2022, Until Sun 10/30/2022 at 2359, Print      lidocaine (Lidoderm) 5 % Apply 1 patch topically daily Remove & Discard patch within 12 hours or as directed by MD, Starting Mon 10/24/2022, Print      methocarbamol (ROBAXIN) 500 mg tablet Take 1 tablet (500 mg total) by mouth 2 (two) times a day, Starting Sun 6/9/2019, Print      naproxen (NAPROSYN) 375 mg tablet Take 1 tablet (375 mg total) by mouth 2 (two) times a day with meals, Starting Mon 10/24/2022, Print                      Jenn Nascimento MD    Portions of the record may have been created with voice recognition software  Occasional wrong word or "sound alike" substitutions may have occurred due to the inherent limitations of voice recognition software    Please read the chart carefully and recognize, using context, where substitutions have occurred     Jenn Nascimento MD  11/03/22 0006

## 2022-11-15 ENCOUNTER — OFFICE VISIT (OUTPATIENT)
Dept: OBGYN CLINIC | Facility: CLINIC | Age: 72
End: 2022-11-15

## 2022-11-15 VITALS
BODY MASS INDEX: 21.47 KG/M2 | HEIGHT: 66 IN | RESPIRATION RATE: 18 BRPM | HEART RATE: 71 BPM | WEIGHT: 133.6 LBS | SYSTOLIC BLOOD PRESSURE: 421 MMHG | OXYGEN SATURATION: 98 % | DIASTOLIC BLOOD PRESSURE: 88 MMHG

## 2022-11-15 DIAGNOSIS — G89.29 ACUTE EXACERBATION OF CHRONIC LOW BACK PAIN: ICD-10-CM

## 2022-11-15 DIAGNOSIS — G89.29 CHRONIC RIGHT-SIDED THORACIC BACK PAIN: Primary | ICD-10-CM

## 2022-11-15 DIAGNOSIS — M54.6 CHRONIC RIGHT-SIDED THORACIC BACK PAIN: Primary | ICD-10-CM

## 2022-11-15 DIAGNOSIS — M54.50 ACUTE EXACERBATION OF CHRONIC LOW BACK PAIN: ICD-10-CM

## 2022-11-15 RX ORDER — ATORVASTATIN CALCIUM 10 MG/1
10 TABLET, FILM COATED ORAL DAILY
COMMUNITY
Start: 2022-10-12

## 2022-11-15 RX ORDER — LOSARTAN POTASSIUM AND HYDROCHLOROTHIAZIDE 12.5; 5 MG/1; MG/1
1 TABLET ORAL DAILY
COMMUNITY
Start: 2022-10-06

## 2022-11-15 RX ORDER — TURMERIC ROOT EXTRACT 500 MG
1000 TABLET ORAL
COMMUNITY

## 2022-11-15 RX ORDER — AMLODIPINE BESYLATE 5 MG/1
5 TABLET ORAL DAILY
COMMUNITY
Start: 2022-10-12

## 2022-11-18 NOTE — PROGRESS NOTES
We are seeing Joshua Hernandez is a 74 y o  female who presents with complaints of lower thoracic back pain  Location: lower thoracic/upper lumbar  Duration: ongoing for several years  Quality: pulling, aching  Severity: moderate  Context: change in position  Modifying Factors: Has found some relief relief with physical therapy  Associated Signs and Symptoms:   Comorbid Conditions:     Patient has known hx of T12 compression fracture  She is getting frustrated that she keeps having recurrence of pain and would like to know what the etiology of the pain is  The following portions of the patient's history were reviewed and updated as appropriate: allergies, current medications, past family history, past medical history, past social history, past surgical history and problem list                Objective:  BP (!) 421/88   Pulse 71   Resp 18   Ht 5' 6" (1 676 m)   Wt 60 6 kg (133 lb 9 6 oz)   SpO2 98%   BMI 21 56 kg/m²        Back  inspection: no gross spinal deformity  No kyphosis, scoliosis or leg discrepancy  Palpation: + paraspinal tenderness in lower thoracic level  +TTP rhomboids and quad lumborum  ROM: Lumbar range of motion is functional in flexion and extension with no referred pain   Special tests: Straight leg raise is negative bl  5/5 lower extremity bilaterally with no sensory deficit to light touch      Imaging:     CT abdomen pelvis wo contrast    Result Date: 11/2/2022  Narrative: CT ABDOMEN AND PELVIS WITHOUT IV CONTRAST INDICATION:   low back pain, hx breast cancer  "74 y o  female presenting with approximately 10 days of low back pain  Patient has a history of prior episodes of low back pain, last was about 3 years ago, lasted for several weeks, resolved after following up with her doctor in [place]    Patient states she was in her normal state of health, has been moving boxes and doing yd work in preparation for selling house, thinks she may have overdone it  "  "Patient does have a history of breast cancer which she says has been in remission since the 1980s" COMPARISON:  CT renal stone study 6/15/2019  TECHNIQUE:  CT examination of the abdomen and pelvis was performed without intravenous contrast  Axial, sagittal, and coronal 2D reformatted images were created from the source data and submitted for interpretation  Radiation dose length product (DLP) for this visit:  582 mGy-cm   This examination, like all CT scans performed in the [place], was performed utilizing techniques to minimize radiation dose exposure, including the use of iterative reconstruction and automated exposure control  Enteric contrast was administered  FINDINGS: ABDOMEN LOWER CHEST:  Right middle lobe scarring  LIVER/BILIARY TREE:  One or more unchanged subcentimeter sharply circumscribed low-density hepatic lesion(s) are noted, too small to accurately characterize, but statistically most likely to represent subcentimeter hepatic cysts  No suspicious solid hepatic lesion is identified  Hepatic contours are normal   No biliary dilatation  GALLBLADDER:  No calcified gallstones  No pericholecystic inflammatory change  SPLEEN:  Unremarkable  PANCREAS:  Unremarkable  ADRENAL GLANDS:  Unremarkable  KIDNEYS/URETERS:  Unchanged simple 10 4 cm right renal cyst  No hydronephrosis  No renal calculi  STOMACH AND BOWEL:  Prominent colonic stool suggesting constipation  APPENDIX:  No findings to suggest appendicitis  ABDOMINOPELVIC CAVITY:  No ascites  No pneumoperitoneum  No lymphadenopathy  VESSELS:  Unremarkable for patient's age  PELVIS REPRODUCTIVE ORGANS:  Unremarkable for patient's age  URINARY BLADDER:  Unremarkable  ABDOMINAL WALL/INGUINAL REGIONS:  Unremarkable  OSSEOUS STRUCTURES:  No acute fracture or destructive osseous lesion  Mild chronic anterior wedge deformity of the left side of the T12 vertebral body  Impression: No acute inflammatory changes in the abdomen or pelvis   Workstation performed: [id number]           Assessment/Plan:     1  Acute exacerbation of chronic low back pain    - Ambulatory Referral to Orthopedic Surgery    2  Chronic right-sided thoracic back pain    - Ambulatory Referral to Physical Therapy; Future      > 45 min devoted to review of previous, pertinent medical records, imaging, discussion of treatment options, counseling and documentation  Lumbar/thoracic spine were evaluated with prior CT scan abdomen-reveals chronic T12 wedge fracture  We discussed the nature of thoracic back pain at length and detailed the treatment approach  Clincal impression is that patients pain symptoms is likely related to muscular etiology  Recommending formal PT- Rx provided for PT  We discussed a HEP and literature was provided for reference  It was explained that this does not supplement formal PT but should serve to bridge the gap, while they wait to get into PT  Advised to avoid any exercises which exacerbate their pain  Follow up in 6-8 weeks  Should sx's worsen or any concerns arise, they were advised to follow up sooner or seek more immediate medical attention  All of the patient's concerns were addressed and questions answered  They verbalized agreement with and understanding of the treatment plan

## 2022-11-28 ENCOUNTER — EVALUATION (OUTPATIENT)
Dept: PHYSICAL THERAPY | Facility: CLINIC | Age: 72
End: 2022-11-28

## 2022-11-28 DIAGNOSIS — G89.29 CHRONIC RIGHT-SIDED THORACIC BACK PAIN: ICD-10-CM

## 2022-11-28 DIAGNOSIS — M54.6 CHRONIC RIGHT-SIDED THORACIC BACK PAIN: ICD-10-CM

## 2022-11-28 NOTE — PROGRESS NOTES
PT Evaluation     Today's date: 2022  Patient name: Jolene Roberts  : 1950  MRN: [id number]  Referring provider: Albertina Keller DO  Dx:   Encounter Diagnosis     ICD-10-CM    1  Chronic right-sided thoracic back pain  M54 6 Ambulatory Referral to Physical Therapy    G89 29                      Assessment  Assessment details: Jolene Roberts is a 74 y o  female presenting as an outpatient to [place] [address] PT w/ c/o chronic t/s pain  In addition, pt presents w/ impaired posture, hypertonicity of surrounding musculature, and decreased ROM significantly limiting pt's functional ability  Pt will benefit from skilled PT services to address the above deficits in order to max function to allow pt to achieve goals in PT  Thank you for the referral of this pt  Impairments: abnormal muscle tone, abnormal or restricted ROM, activity intolerance, impaired physical strength, lacks appropriate home exercise program, pain with function and poor posture   Understanding of Dx/Px/POC: good   Prognosis: good    Goals  ST  Pain decreased by 25% in 4-6 weeks  2  ROM increased by 25% in 4-6 weeks  LT  Decrease pain to 1-2/10 at worst by d/c   2  Increase ROM to [facility] for all deficient movements by d/c   3  Strength increased to 5 for all deficient muscle groups by d/c   4  IADL performance increased to max function by d/c   5  Recreational performance increased to max function by d/c        Plan  Planned modality interventions: cryotherapy and thermotherapy: hydrocollator packs  Other planned modality interventions: other modalities PRN  Planned therapy interventions: abdominal trunk stabilization, ADL retraining, IADL retraining, flexibility, functional ROM exercises, home exercise program, manual therapy, joint mobilization, neuromuscular re-education, patient education, postural training, strengthening, therapeutic exercise and therapeutic activities  Other planned therapy interventions: other interventions PRN  Frequency: 1-2x/week  Duration in weeks: 4  Plan of Care beginning date: 2022  Plan of Care expiration date: 2022  Treatment plan discussed with: patient        Subjective Evaluation    History of Present Illness  Mechanism of injury: Pt reports to IE w/ sister present w/ c/o t/s pain (currently L sided) which has occurred "since the " w/o any known injury  She has had intermittent exacerbations of symptoms 3-4x/year which typically last from 7-10 days since then  However, she reports that current onset of symptoms started approx 3 weeks ago and became unbearable  Sister believes it may have been related to blowing leaves using excessive t/s rotations  She reports that pain typically starts in c/s and periscap region and travels to t/s  Pt/sister report that the only way she was able to find any relief of symptoms was w/ Dilaudid IM injection approx 2 weeks ago which helped significantly  Pt reports that fibromyalgia has been r/o as a dx  She adds that she has had a known renal cyst for >20 years - does not know if somehow contributing to symptoms  Pt denies any numbness/parasthesias  Pt denies any bowel/bladder changes  Pt was given exercises for her t/s from by orthopedic in the past, but has never had formal skilled PT tx  Pt would like to transfer to [address] [place] [address] clinic as soon as an opening is available as this is much closer to her home  Pain  Current pain ratin  At worst pain ratin  Location: thoracic spine  Alleviating factors: dilaudid injection  Exacerbated by: twisting, reaching, making the bed, static standing especially w/ dishes; no longer pain sleeping at night     Social Support  Stairs in house: yes (Pt currently living in her vacation home; permanent residence is [address] [place] [address] and pt states that she will be going back there in January)   Lives with: sister is currently living w/ her in her "summer" home and lives along in [address] [place] [address]      Employment status: working (Pt is an  (works part time)- spends a long time sitting at a desk for hours at a time)  Patient Goals  Patient goals for therapy: decreased pain  Patient goal: Learn HEP        Objective     Active Range of Motion   Cervical/Thoracic Spine       Cervical    Flexion:  WFL  Extension:  Restriction level: minimal  Left lateral flexion:  Restriction level: minimal  Right lateral flexion:  Restriction level minimal  Left rotation:  Restriction level: minimal  Right rotation:  Restriction level: minimal    Thoracic    Flexion:  WFL  Extension:  Restriction level: maximal  Left rotation:  Restriction level: moderate  Right rotation:  Restriction level: minimal    Additional Active Range of Motion Details  Shoulder ROM: Grossly WFL b/l for flex, abd, IR, ER    Strength/Myotome Testing     Left Shoulder     Planes of Motion   Flexion: 4+   Abduction: 4+     Right Shoulder     Planes of Motion   Flexion: 4+   Abduction: 4+     Left Elbow   Flexion: 5  Extension: 5    Right Elbow   Flexion: 5  Extension: 5    Left Wrist/Hand   Wrist extension: 5  Wrist flexion: 5  Thumb extension: 5    Right Wrist/Hand   Wrist extension: 5  Wrist flexion: 5  Thumb extension: 5    General Comments:      Cervical/Thoracic Comments  Palpation: Hypertonicity/tenderness w/ palpation w/ b/l UT (L worse vs R) and b/l periscap/subscap musculature    Posture: Pt tends to sit w/ fwd, rounded shoulders and R shoulder slightly more forward vs  L        Daily Treatment Diary    EPOC: 12/26/22  Precautions: OSTEOPOROSIS; HTN- takes meds;  Hx of Breast CA  Co- Morbidities: HTN ;Hx of Breast CA; Osteoporosis    Manuals 11/28                                                   Total Time             There Ex    Pt ed 8' HEP create/handout; expectations, prognosis            UBE- retro             T/s ext             T/s rot             pball t/s flex stretch                                                    Neuro-Re-ed    Post sh rolls scap squeeze                          LPD/rows             Cristina pallof press             Cristina walk outs             DLS: isometric abdominals- TA             DLS: fall outs w/ TA contraction             DLS: marches w/ TA contraction                          Ther Activity                              Gait Training                              Modalities    CP PRN Home if needed

## 2022-11-30 ENCOUNTER — OFFICE VISIT (OUTPATIENT)
Dept: PHYSICAL THERAPY | Facility: CLINIC | Age: 72
End: 2022-11-30

## 2022-11-30 DIAGNOSIS — M54.6 CHRONIC RIGHT-SIDED THORACIC BACK PAIN: Primary | ICD-10-CM

## 2022-11-30 DIAGNOSIS — G89.29 CHRONIC RIGHT-SIDED THORACIC BACK PAIN: Primary | ICD-10-CM

## 2022-11-30 NOTE — PROGRESS NOTES
Daily Note     Today's date: 2022  Patient name: Andrew Garcia  : 1950  MRN: [id number]  Referring provider: Shauna Shaw DO  Dx:   Encounter Diagnosis     ICD-10-CM    1  Chronic right-sided thoracic back pain  M54 6     G89 29                      Subjective:  Pt reports being more painful/achey post IE  However, she states that pain pretty much resolved by the next day  Objective: See treatment diary below      Assessment: Initiated pt's plan of care this visit w/ good tolerance  Tolerated treatment well except for initially pain w/ pallof press  Improved w/ decrease in weight and correction of technique  Patient would benefit from continued PT      Plan: Continue per plan of care  Daily Treatment Diary    EPOC: 22  Precautions: OSTEOPOROSIS; HTN- takes meds;  Hx of Breast CA  Co- Morbidities: HTN ;Hx of Breast CA; Osteoporosis    Manuals                                                   Total Time             There Ex    Pt ed 8' HEP create/handout; expectations, prognosis            UBE- retro  3'           T/s ext  2-3" x 10           T/s rot  2-3" x 10 ea dir b/l            pball t/s flex stretch  10"x10                                                  Neuro-Re-ed    Post sh rolls  20x           scap squeeze  10"x10                        LPD/rows  YTB 2x10/YTB 2x10           Orange pallof press  5# 10x ea dir b/l            Orange walk outs             DLS: isometric abdominals- TA  10"x10           DLS: fall outs w/ TA contraction  15x ea b/l            DLS: marches w/ TA contraction  15x ea b/l                         Ther Activity                              Gait Training                              Modalities    CP PRN Home if needed

## 2022-12-12 ENCOUNTER — EVALUATION (OUTPATIENT)
Dept: PHYSICAL THERAPY | Facility: CLINIC | Age: 72
End: 2022-12-12

## 2022-12-12 DIAGNOSIS — G89.29 CHRONIC RIGHT-SIDED THORACIC BACK PAIN: Primary | ICD-10-CM

## 2022-12-12 DIAGNOSIS — M54.6 CHRONIC RIGHT-SIDED THORACIC BACK PAIN: Primary | ICD-10-CM

## 2022-12-12 NOTE — PROGRESS NOTES
PT Re-Evaluation     Today's date: 2022  Patient name: Jaleel Enamorado  : 1950  MRN: [id number]  Referring provider: Hao Mccann DO  Dx:   Encounter Diagnosis     ICD-10-CM    1  Chronic right-sided thoracic back pain  M54 6     G89 29           Start Time: 1549  Stop Time: 1629  Total time in clinic (min): 40 minutes    Assessment  Assessment details: Upon re-examination, pt presents with impairments of decreased strength, decreased ROM, and increased pain of pt's thoracic spine resulting in limitations of self-care/ADLs, household activities, and lifting objects  Pt plan of care will focus on improving strength and ROM of pt's thoracic spine as well as decreasing pain and improving tolerance to functional activities  Pt would benefit from skilled physical therapy to improve pt overall functional mobility and quality of life  Impairments: abnormal or restricted ROM, activity intolerance, impaired balance, lacks appropriate home exercise program, pain with function and poor posture   Functional limitations: self-care/ADLs, household activities, and lifting objects  Symptom irritability: lowUnderstanding of Dx/Px/POC: good   Prognosis: good    Goals  STG- 4 weeks  1  Pt will have a subjective decrease in pain of her thoracic spine by 2 levels or more during household activities  2  Pt will demonstrate independence of HEP    LTG - 8 weeks  1  Pt will demonstrate an improvement of thoracic spine AROM in all planes by 5 levels or more to facilitate performance of ADLs  2  Pt's FOTO score will improve from 37 to 52 or better to facilitate a return to pt's prior level of function        Plan  Patient would benefit from: PT eval and skilled physical therapy  Planned modality interventions: cryotherapy, thermotherapy: hydrocollator packs and unattended electrical stimulation  Planned therapy interventions: abdominal trunk stabilization, ADL retraining, behavior modification, flexibility, functional ROM exercises, graded exercise, home exercise program, joint mobilization, manual therapy, massage, Vasques taping, neuromuscular re-education, patient education, postural training, self care, strengthening, stretching, therapeutic activities and therapeutic exercise  Frequency: 2x week  Duration in weeks: 8  Plan of Care beginning date: 2022  Plan of Care expiration date: 2023  Treatment plan discussed with: patient        Subjective Evaluation    History of Present Illness  Mechanism of injury: Pt is a 73 y/o female presenting to physical therapy with L sided thoracic pain  Pt reports the pain (currently L sided) which has occurred "since the " w/o any known injury  She has had intermittent exacerbations of symptoms 3-4x/year which typically last from 7-10 days since then  However, she reports that current onset of symptoms started approx 3 weeks ago and became unbearable  Sister believes it may have been related to blowing leaves using excessive t/s rotations  She reports that pain typically starts in c/s and periscap region and travels to t/s  Pt/sister report that the only way she was able to find any relief of symptoms was w/ Dilaudid IM injection approx 2 weeks ago which helped significantly  Pt reports she will have increased pain with any activity that involved bending and twisting as well as with cleaning the sink and when traveling to the city on a bus  Pt reports she will have decreased pain with medication, heating pad, and laying in a semi-recumbent  Pt reports she currently has a pulling dull pain in her upper back today  Pt reports she started therapy at [facility] at the end of November which went ok     Pain  Current pain ratin  At best pain ratin  At worst pain rating: 10  Location: L side thoracic spine  Quality: pulling and dull ache  Aggravating factors: sitting  Progression: improved    Treatments  Previous treatment: physical therapy and medication  Current treatment: medication and physical therapy  Patient Goals  Patient goals for therapy: decreased pain          Objective     Palpation   Left   Tenderness of the cervical interspinals       Active Range of Motion   Cervical/Thoracic Spine       Cervical    Flexion: 30 degrees  Restriction level: moderate  Extension: 35 degrees     Restriction level: moderate  Left lateral flexion: 30 degrees     Restriction level: minimal  Right lateral flexion: 20 degrees     Restriction level moderate  Left rotation: 40 degrees Restriction level: moderate  Right rotation: 50 degrees    Restriction level: moderate    Thoracic    Flexion:  Restriction level: moderate  Extension:  Restriction level: moderate  Left lateral flexion:  Restriction level: moderate  Right lateral flexion:  Restriction level: moderate  Left rotation:  Restriction level: moderate  Right rotation:  Restriction level: moderate    Strength/Myotome Testing   Cervical Spine   Neck extension: 4-  Neck flexion: 4-    Left   Neck lateral flexion (C3): 4-    Right   Neck lateral flexion (C3): 4-    Left Shoulder     Isolated Muscles   Lower trapezius: 4-   Middle trapezius: 4-   Upper trapezius: 4-     Right Shoulder     Isolated Muscles   Lower trapezius: 4-   Middle trapezius: 4-   Upper trapezius: 4-     General Comments:      Cervical/Thoracic Comments  Significant restriction in mobility of pt's thoracic spine             Precautions: Hx of Cancer, HTN, Osteopenia      Manuals 12/12            Prone thoracic mobilizations 8'            IASTM (TG) 7'                                      Neuro Re-Ed             Education on POC, diagnosis, and HEP 8'            Scapular retraction 1x10 3"                                                                             Ther Ex             Seated thoracic extension 1x10            Open books 1x10 ea                                                                                          Ther Activity Gait Training                                       Modalities

## 2022-12-14 ENCOUNTER — OFFICE VISIT (OUTPATIENT)
Dept: PHYSICAL THERAPY | Facility: CLINIC | Age: 72
End: 2022-12-14

## 2022-12-14 DIAGNOSIS — G89.29 CHRONIC RIGHT-SIDED THORACIC BACK PAIN: Primary | ICD-10-CM

## 2022-12-14 DIAGNOSIS — M54.6 CHRONIC RIGHT-SIDED THORACIC BACK PAIN: Primary | ICD-10-CM

## 2022-12-14 NOTE — PROGRESS NOTES
Daily Note     Today's date: 2022  Patient name: Marga Caldwell  : 1950  MRN: [id number]  Referring provider: Robles Villa DO  Dx:   Encounter Diagnosis     ICD-10-CM    1  Chronic right-sided thoracic back pain  M54 6     G89 29                      Subjective: Pt reports feeling a little sore yesterday      Objective: See treatment diary below      Assessment: Tolerated treatment well  She reports feeling a strong stretch with doorway stretch  No increase in pain verbalized but she does report muscular fatigue with TB exercises  She follows all cues for correct exercise form and performance  Patient demonstrated fatigue post treatment, exhibited good technique with therapeutic exercises and would benefit from continued PT      Plan: Continue per plan of care        Precautions: Hx of Cancer, HTN, Osteopenia      Manuals            Prone thoracic mobilizations 8' 5' PATEL           IASTM (TG) 7' 8'                                     Neuro Re-Ed             Education on POC, diagnosis, and HEP 8'            Scapular retraction 1x10 3" 1x10 3"           TB rows  GTB 2x10           TB extension  GTB 2x10           TB horizontal abd  OTB 10x           TB no monies  OTB 10x           Cervical retraction  5" x10           Ther Ex             Seated thoracic extension 1x10 1x10 5"           Open books 1x10 ea 1x10 ea           Seated thoracic rotation  1x10 5"           Doorway stretch  20"x4                                                               Ther Activity                                       Gait Training                                       Modalities

## 2022-12-16 ENCOUNTER — APPOINTMENT (OUTPATIENT)
Dept: PHYSICAL THERAPY | Facility: CLINIC | Age: 72
End: 2022-12-16

## 2022-12-19 ENCOUNTER — APPOINTMENT (OUTPATIENT)
Dept: PHYSICAL THERAPY | Facility: CLINIC | Age: 72
End: 2022-12-19

## 2022-12-22 ENCOUNTER — APPOINTMENT (OUTPATIENT)
Dept: PHYSICAL THERAPY | Facility: CLINIC | Age: 72
End: 2022-12-22

## 2022-12-29 ENCOUNTER — OFFICE VISIT (OUTPATIENT)
Dept: PHYSICAL THERAPY | Facility: CLINIC | Age: 72
End: 2022-12-29

## 2022-12-29 DIAGNOSIS — G89.29 CHRONIC RIGHT-SIDED THORACIC BACK PAIN: Primary | ICD-10-CM

## 2022-12-29 DIAGNOSIS — M54.6 CHRONIC RIGHT-SIDED THORACIC BACK PAIN: Primary | ICD-10-CM

## 2022-12-29 NOTE — PROGRESS NOTES
Daily Note     Today's date: 2022  Patient name: Kylah Montes  : 1950  MRN: [id number]  Referring provider: Mel Berg DO  Dx:   Encounter Diagnosis     ICD-10-CM    1  Chronic right-sided thoracic back pain  M54 6     G89 29                      Subjective: Pt reports she was lifting boxes yesterday and things are kind of flared up from that  Objective: See treatment diary below      Assessment: Tolerated treatment well  Provided pt with updated HEP and stronger TB for home use  She reports minor increase in muscular pain with thoracic exercises, which decreased after manual therapy  She tolerates all exercises as outlined below  Patient demonstrated fatigue post treatment, exhibited good technique with therapeutic exercises and would benefit from continued PT      Plan: Continue per plan of care        Precautions: Hx of Cancer, HTN, Osteopenia      Manuals           Prone thoracic mobilizations 8' 5' PATEL           IASTM (TG) 7' 8' 12'                                    Neuro Re-Ed             Education on POC, diagnosis, and HEP 8'            Scapular retraction 1x10 3" 1x10 3" 1x15 5"          TB rows  GTB 2x10 GTB 2x10          TB extension  GTB 2x10 GTB 2x10          TB horizontal abd  OTB 10x OTB 2x10          TB no monies  OTB 10x OTB 2x10          Cervical retraction  5" x10 1x10 5"          Ther Ex             Seated thoracic extension 1x10 1x10 5" 1x10 5"          Open books 1x10 ea 1x10 ea 1x10 ea          Seated thoracic rotation  1x10 5" 1x10 5"          Doorway stretch  20"x4 20"x4                                                              Ther Activity                                       Gait Training                                       Modalities

## 2024-09-20 NOTE — PROCEDURE
[de-identified] : \par Short-arm cast was removed. A thumb spica splint was applied which she brought in with her today. Alert and oriented, no focal deficits, no motor or sensory deficits.

## 2024-11-12 NOTE — ASU PATIENT PROFILE, ADULT - NS PREOP UNDERSTANDS INFO
Time by MD; No solid food/dairy/candy/gum after midnight tonight; water allowed before 11:00am tomorrow; patient reminded to come with photo ID/insurance/credit card; dress in comfortable clothes; no jewelries/contact lens; no smoking/alcohol consumption/recreational drug use today; escort must have an ID card; address and callback number was given/yes

## 2024-11-12 NOTE — ASU PATIENT PROFILE, ADULT - FALL HARM RISK - UNIVERSAL INTERVENTIONS
Bed in lowest position, wheels locked, appropriate side rails in place/Call bell, personal items and telephone in reach/Instruct patient to call for assistance before getting out of bed or chair/Non-slip footwear when patient is out of bed/[place] to call system/Physically safe environment - no spills, clutter or unnecessary equipment/Purposeful Proactive Rounding/Room/bathroom lighting operational, light cord in reach

## 2024-11-12 NOTE — ASU PATIENT PROFILE, ADULT - NSICDXPASTSURGICALHX_GEN_ALL_CORE_FT
PAST SURGICAL HISTORY:  History of colonoscopy      PAST SURGICAL HISTORY:  Chest wall recurrence of breast cancer right    H/O left breast biopsy     H/O right mastectomy     History of colonoscopy

## 2024-11-12 NOTE — ASU PATIENT PROFILE, ADULT - NSICDXPASTMEDICALHX_GEN_ALL_CORE_FT
PAST MEDICAL HISTORY:  Breast cancer     HTN (hypertension)      PAST MEDICAL HISTORY:  Arthritis     Breast cancer     HTN (hypertension)     Hyperlipidemia     LISA on CPAP

## 2024-11-13 ENCOUNTER — OUTPATIENT (OUTPATIENT)
Dept: OUTPATIENT SERVICES | Facility: HOSPITAL | Age: 74
LOS: 1 days | Discharge: HOME | End: 2024-11-13

## 2024-11-13 ENCOUNTER — TRANSCRIPTION ENCOUNTER (OUTPATIENT)
Age: 74
End: 2024-11-13

## 2024-11-13 VITALS
OXYGEN SATURATION: 96 % | HEART RATE: 78 BPM | TEMPERATURE: 98 F | DIASTOLIC BLOOD PRESSURE: 54 MMHG | SYSTOLIC BLOOD PRESSURE: 130 MMHG | RESPIRATION RATE: 16 BRPM

## 2024-11-13 VITALS
HEART RATE: 76 BPM | TEMPERATURE: 97 F | HEIGHT: 66 IN | RESPIRATION RATE: 16 BRPM | DIASTOLIC BLOOD PRESSURE: 55 MMHG | SYSTOLIC BLOOD PRESSURE: 152 MMHG | WEIGHT: 132.5 LBS | OXYGEN SATURATION: 98 %

## 2024-11-13 DIAGNOSIS — C50.919 MALIGNANT NEOPLASM OF UNSPECIFIED SITE OF UNSPECIFIED FEMALE BREAST: Chronic | ICD-10-CM

## 2024-11-13 DIAGNOSIS — Z90.11 ACQUIRED ABSENCE OF RIGHT BREAST AND NIPPLE: Chronic | ICD-10-CM

## 2024-11-13 DIAGNOSIS — Z98.890 OTHER SPECIFIED POSTPROCEDURAL STATES: Chronic | ICD-10-CM

## 2024-11-13 DEVICE — LENS IOL TECNIS PROTEC ZCB00 18.5D
Type: IMPLANTABLE DEVICE | Site: LEFT | Status: NON-FUNCTIONAL
Removed: 2024-11-13

## 2024-11-13 RX ORDER — TROPICAMIDE 1 %
1 DROPS OPHTHALMIC (EYE)
Refills: 0 | Status: COMPLETED | OUTPATIENT
Start: 2024-11-13 | End: 2024-11-13

## 2024-11-13 RX ORDER — CALCIUM CARBONATE 400(1000)
2 TABLET,CHEWABLE ORAL
Refills: 0 | DISCHARGE

## 2024-11-13 RX ORDER — CYCLOPENTOLATE HCL 1 %
1 DROPS OPHTHALMIC (EYE)
Refills: 0 | Status: COMPLETED | OUTPATIENT
Start: 2024-11-13 | End: 2024-11-13

## 2024-11-13 RX ORDER — OMEGA-3/EPA/FISH OIL 910-1300MG
1000 CAPSULE,DELAYED RELEASE (ENTERIC COATED) ORAL
Refills: 0 | DISCHARGE

## 2024-11-13 RX ORDER — KETOROLAC TROMETHAMINE 5 MG/ML
1 SOLUTION OPHTHALMIC
Refills: 0 | Status: COMPLETED | OUTPATIENT
Start: 2024-11-13 | End: 2024-11-13

## 2024-11-13 RX ORDER — PHENYLEPHRINE HYDROCHLORIDE 25 MG/ML
1 SOLUTION/ DROPS OPHTHALMIC
Refills: 0 | Status: COMPLETED | OUTPATIENT
Start: 2024-11-13 | End: 2024-11-13

## 2024-11-13 RX ORDER — CYCLOBENZAPRINE HYDROCHLORIDE 30 MG/1
1 CAPSULE, EXTENDED RELEASE ORAL
Refills: 0 | DISCHARGE

## 2024-11-13 RX ORDER — ACETAMINOPHEN 500 MG
650 TABLET ORAL ONCE
Refills: 0 | Status: DISCONTINUED | OUTPATIENT
Start: 2024-11-13 | End: 2024-11-13

## 2024-11-13 RX ORDER — ONDANSETRON HYDROCHLORIDE 2 MG/ML
4 INJECTION, SOLUTION INTRAMUSCULAR; INTRAVENOUS ONCE
Refills: 0 | Status: DISCONTINUED | OUTPATIENT
Start: 2024-11-13 | End: 2024-11-13

## 2024-11-13 RX ORDER — HYDROCODONE BITARTRATE 40 MG/1
0 CAPSULE, EXTENDED RELEASE ORAL
Refills: 0 | DISCHARGE

## 2024-11-13 RX ORDER — AMLODIPINE BESYLATE 10 MG
1 TABLET ORAL
Refills: 0 | DISCHARGE

## 2024-11-13 RX ORDER — OFLOXACIN 3 MG/ML
1 SOLUTION OPHTHALMIC
Refills: 0 | Status: COMPLETED | OUTPATIENT
Start: 2024-11-13 | End: 2024-11-13

## 2024-11-13 RX ORDER — LOSARTAN POTASSIUM AND HYDROCHLOROTHIAZIDE 50; 12.5 MG/1; MG/1
1 TABLET, FILM COATED ORAL
Refills: 0 | DISCHARGE

## 2024-11-13 RX ADMIN — PHENYLEPHRINE HYDROCHLORIDE 1 DROP(S): 25 SOLUTION/ DROPS OPHTHALMIC at 14:27

## 2024-11-13 RX ADMIN — OFLOXACIN 1 DROP(S): 3 SOLUTION OPHTHALMIC at 14:21

## 2024-11-13 RX ADMIN — OFLOXACIN 1 DROP(S): 3 SOLUTION OPHTHALMIC at 14:31

## 2024-11-13 RX ADMIN — KETOROLAC TROMETHAMINE 1 DROP(S): 5 SOLUTION OPHTHALMIC at 14:26

## 2024-11-13 RX ADMIN — Medication 1 DROP(S): at 14:28

## 2024-11-13 RX ADMIN — Medication 1 DROP(S): at 14:21

## 2024-11-13 RX ADMIN — Medication 1 DROP(S): at 14:22

## 2024-11-13 RX ADMIN — PHENYLEPHRINE HYDROCHLORIDE 1 DROP(S): 25 SOLUTION/ DROPS OPHTHALMIC at 14:32

## 2024-11-13 RX ADMIN — OFLOXACIN 1 DROP(S): 3 SOLUTION OPHTHALMIC at 14:26

## 2024-11-13 RX ADMIN — KETOROLAC TROMETHAMINE 1 DROP(S): 5 SOLUTION OPHTHALMIC at 14:22

## 2024-11-13 RX ADMIN — Medication 1 DROP(S): at 14:27

## 2024-11-13 RX ADMIN — Medication 1 DROP(S): at 14:32

## 2024-11-13 RX ADMIN — PHENYLEPHRINE HYDROCHLORIDE 1 DROP(S): 25 SOLUTION/ DROPS OPHTHALMIC at 14:22

## 2024-11-13 RX ADMIN — KETOROLAC TROMETHAMINE 1 DROP(S): 5 SOLUTION OPHTHALMIC at 14:32

## 2024-11-13 NOTE — OPERATIVE REPORT - OPERATIVE RPOSRT DETAILS
Date of Procedure: 11/13/2024    Surgeon:  Hever Niño    Diagnosis:  CATARACT LEFT    Procedure:  CATARACT REMOVAL with IOL LEFT    Anesthesia: MAC    Complications: None      The patient was prepped and draped in the usual sterile fashion. A lid speculum is placed in the eye. Vega scissors are used to dissect conjunctiva and tenons from the superior limbus for one clock hour. Wet field cautery is used to obtain hemostasis. A crescent blade is used to create a circum-limbal groove 2mm posterior to the surgical limbus at 12 o’clock and is dissected at fifty percent scleral thickness just to clear cornea. A fifteen degree super-blade is used to create a paracentesis at the ten and two o’clock positions. The chamber is then entered with a 2.4mm blade through the scleral flap. Healon is used to reconstitute the anterior chamber. A capsulorhexus is then created with a cystitome for 360 degrees. The dissected capsule is removed with a curved forceps and hydrodissection is performed using BSS on an air needle. Next, the lens is removed with torsional ultrasound with residual cortex removed with the I/A unit.    Next, with the anterior chamber reconstituted with healon, a  is used to insert a foldable IOL into the capsular bag. The lens is rotated into central position and the haptics are seen to extend fully. Residual Healon is removed with the I/A unit  and the pupil is brought down with Miochol. One 10-0 nylon suture is used to close the scleral wound and one suture is used to close conjunctiva and tenons. The lid speculum is removed and pilocarpine 2% with Tobradex ointment is placed on the cornea and the eye is closed, patched and shielded.    The patient is transferred to the recovery in baseline condition.

## 2024-11-13 NOTE — ASU DISCHARGE PLAN (ADULT/PEDIATRIC) - FINANCIAL ASSISTANCE
[facility] provides services at a reduced cost to those who are determined to be eligible through [facility]’s financial assistance program. Information regarding [facility]’s financial assistance program can be found by going to [email or web address] or by calling [phone].

## (undated) DEVICE — GLV 7.5 PROTEXIS (BLUE)

## (undated) DEVICE — ELCTR BIPOLAR CORD J&J 12FT DISP

## (undated) DEVICE — KNIFE ALCON CRESCENT ANGLED BEVEL UP 2.3MM (PINK)

## (undated) DEVICE — DRAPE MICROSCOPE KNOB COVER SMALL (2 PCS)

## (undated) DEVICE — PACK CENTURION 2.4MM

## (undated) DEVICE — Device

## (undated) DEVICE — SUT ETHILON 10-0 12" TG160-4

## (undated) DEVICE — KNIFE ALCON STANDARD FULL HANDLE 15 DEG (PINK)

## (undated) DEVICE — TIP OZIL 12 DEGREE MINI FLARE

## (undated) DEVICE — ELCTR ERASER BI-P BVL 45DEG 18G